# Patient Record
Sex: MALE | Race: WHITE | Employment: FULL TIME | URBAN - METROPOLITAN AREA
[De-identification: names, ages, dates, MRNs, and addresses within clinical notes are randomized per-mention and may not be internally consistent; named-entity substitution may affect disease eponyms.]

---

## 2017-01-04 ENCOUNTER — TELEPHONE (OUTPATIENT)
Dept: FAMILY MEDICINE CLINIC | Age: 37
End: 2017-01-04

## 2017-01-04 DIAGNOSIS — J06.9 UPPER RESPIRATORY TRACT INFECTION, UNSPECIFIED TYPE: Primary | ICD-10-CM

## 2017-01-05 RX ORDER — BENZONATATE 200 MG/1
200 CAPSULE ORAL 3 TIMES DAILY PRN
Qty: 30 CAPSULE | Refills: 0 | Status: SHIPPED | OUTPATIENT
Start: 2017-01-05 | End: 2017-01-12

## 2017-01-05 RX ORDER — AZITHROMYCIN 250 MG/1
TABLET, FILM COATED ORAL
Qty: 1 PACKET | Refills: 0 | Status: SHIPPED | OUTPATIENT
Start: 2017-01-05 | End: 2017-01-15

## 2017-02-07 ENCOUNTER — OFFICE VISIT (OUTPATIENT)
Dept: FAMILY MEDICINE CLINIC | Age: 37
End: 2017-02-07

## 2017-02-07 VITALS
HEART RATE: 140 BPM | RESPIRATION RATE: 20 BRPM | TEMPERATURE: 99.4 F | SYSTOLIC BLOOD PRESSURE: 116 MMHG | BODY MASS INDEX: 27.99 KG/M2 | DIASTOLIC BLOOD PRESSURE: 84 MMHG | WEIGHT: 206.4 LBS

## 2017-02-07 DIAGNOSIS — R00.0 TACHYCARDIA: ICD-10-CM

## 2017-02-07 DIAGNOSIS — F51.04 PSYCHOPHYSIOLOGICAL INSOMNIA: ICD-10-CM

## 2017-02-07 DIAGNOSIS — F41.1 GENERALIZED ANXIETY DISORDER: Primary | ICD-10-CM

## 2017-02-07 PROCEDURE — 99214 OFFICE O/P EST MOD 30 MIN: CPT | Performed by: FAMILY MEDICINE

## 2017-02-07 RX ORDER — ZOLPIDEM TARTRATE 10 MG/1
TABLET ORAL
Qty: 30 TABLET | Refills: 0 | Status: SHIPPED | OUTPATIENT
Start: 2017-02-07 | End: 2017-03-09 | Stop reason: SDUPTHER

## 2017-02-07 RX ORDER — ALPRAZOLAM 1 MG/1
1 TABLET ORAL 3 TIMES DAILY PRN
Qty: 90 TABLET | Refills: 2 | Status: SHIPPED | OUTPATIENT
Start: 2017-02-07 | End: 2017-03-31 | Stop reason: SDUPTHER

## 2017-02-14 DIAGNOSIS — R00.0 TACHYCARDIA: ICD-10-CM

## 2017-02-15 LAB
A/G RATIO: 1.4 (ref 1.1–2.2)
ALBUMIN SERPL-MCNC: 4.4 G/DL (ref 3.4–5)
ALP BLD-CCNC: 75 U/L (ref 40–129)
ALT SERPL-CCNC: 26 U/L (ref 10–40)
ANION GAP SERPL CALCULATED.3IONS-SCNC: 16 MMOL/L (ref 3–16)
AST SERPL-CCNC: 23 U/L (ref 15–37)
BILIRUB SERPL-MCNC: 0.3 MG/DL (ref 0–1)
BUN BLDV-MCNC: 14 MG/DL (ref 7–20)
CALCIUM SERPL-MCNC: 9.4 MG/DL (ref 8.3–10.6)
CHLORIDE BLD-SCNC: 102 MMOL/L (ref 99–110)
CO2: 25 MMOL/L (ref 21–32)
CREAT SERPL-MCNC: 0.8 MG/DL (ref 0.9–1.3)
GFR AFRICAN AMERICAN: >60
GFR NON-AFRICAN AMERICAN: >60
GLOBULIN: 3.1 G/DL
GLUCOSE BLD-MCNC: 91 MG/DL (ref 70–99)
HCT VFR BLD CALC: 47 % (ref 40.5–52.5)
HEMOGLOBIN: 15.5 G/DL (ref 13.5–17.5)
MCH RBC QN AUTO: 30 PG (ref 26–34)
MCHC RBC AUTO-ENTMCNC: 33 G/DL (ref 31–36)
MCV RBC AUTO: 90.9 FL (ref 80–100)
PDW BLD-RTO: 14.9 % (ref 12.4–15.4)
PLATELET # BLD: 270 K/UL (ref 135–450)
PMV BLD AUTO: 8.1 FL (ref 5–10.5)
POTASSIUM SERPL-SCNC: 4.2 MMOL/L (ref 3.5–5.1)
RBC # BLD: 5.17 M/UL (ref 4.2–5.9)
SODIUM BLD-SCNC: 143 MMOL/L (ref 136–145)
TOTAL PROTEIN: 7.5 G/DL (ref 6.4–8.2)
TSH REFLEX: 0.96 UIU/ML (ref 0.27–4.2)
WBC # BLD: 8.5 K/UL (ref 4–11)

## 2017-03-09 ENCOUNTER — OFFICE VISIT (OUTPATIENT)
Dept: FAMILY MEDICINE CLINIC | Age: 37
End: 2017-03-09

## 2017-03-09 VITALS
RESPIRATION RATE: 20 BRPM | DIASTOLIC BLOOD PRESSURE: 79 MMHG | OXYGEN SATURATION: 98 % | BODY MASS INDEX: 27.94 KG/M2 | HEART RATE: 101 BPM | SYSTOLIC BLOOD PRESSURE: 131 MMHG | WEIGHT: 206 LBS

## 2017-03-09 DIAGNOSIS — Z13.39 ADHD (ATTENTION DEFICIT HYPERACTIVITY DISORDER) EVALUATION: ICD-10-CM

## 2017-03-09 DIAGNOSIS — F51.04 PSYCHOPHYSIOLOGICAL INSOMNIA: ICD-10-CM

## 2017-03-09 DIAGNOSIS — R10.9 RIGHT FLANK PAIN: Primary | ICD-10-CM

## 2017-03-09 PROCEDURE — 99214 OFFICE O/P EST MOD 30 MIN: CPT | Performed by: FAMILY MEDICINE

## 2017-03-09 RX ORDER — DEXTROAMPHETAMINE SACCHARATE, AMPHETAMINE ASPARTATE, DEXTROAMPHETAMINE SULFATE AND AMPHETAMINE SULFATE 2.5; 2.5; 2.5; 2.5 MG/1; MG/1; MG/1; MG/1
10 TABLET ORAL DAILY
Qty: 30 TABLET | Refills: 0 | Status: SHIPPED | OUTPATIENT
Start: 2017-03-09 | End: 2017-03-31 | Stop reason: ALTCHOICE

## 2017-03-09 RX ORDER — ZOLPIDEM TARTRATE 10 MG/1
TABLET ORAL
Qty: 30 TABLET | Refills: 0 | Status: SHIPPED | OUTPATIENT
Start: 2017-03-09 | End: 2017-03-31 | Stop reason: SDUPTHER

## 2017-03-31 ENCOUNTER — OFFICE VISIT (OUTPATIENT)
Dept: FAMILY MEDICINE CLINIC | Age: 37
End: 2017-03-31

## 2017-03-31 VITALS
OXYGEN SATURATION: 96 % | SYSTOLIC BLOOD PRESSURE: 135 MMHG | HEART RATE: 102 BPM | DIASTOLIC BLOOD PRESSURE: 88 MMHG | RESPIRATION RATE: 20 BRPM | TEMPERATURE: 95.6 F | WEIGHT: 213.4 LBS | BODY MASS INDEX: 28.94 KG/M2

## 2017-03-31 DIAGNOSIS — F90.9 ADULT ADHD: Primary | ICD-10-CM

## 2017-03-31 DIAGNOSIS — F41.9 ANXIETY: ICD-10-CM

## 2017-03-31 DIAGNOSIS — F51.04 PSYCHOPHYSIOLOGICAL INSOMNIA: ICD-10-CM

## 2017-03-31 DIAGNOSIS — F41.1 GENERALIZED ANXIETY DISORDER: ICD-10-CM

## 2017-03-31 PROCEDURE — 99214 OFFICE O/P EST MOD 30 MIN: CPT | Performed by: FAMILY MEDICINE

## 2017-03-31 RX ORDER — DEXTROAMPHETAMINE SACCHARATE, AMPHETAMINE ASPARTATE, DEXTROAMPHETAMINE SULFATE AND AMPHETAMINE SULFATE 5; 5; 5; 5 MG/1; MG/1; MG/1; MG/1
20 TABLET ORAL 2 TIMES DAILY
Qty: 60 TABLET | Refills: 0 | Status: CANCELLED | OUTPATIENT
Start: 2017-05-30

## 2017-03-31 RX ORDER — ZOLPIDEM TARTRATE 10 MG/1
TABLET ORAL
Qty: 90 TABLET | Refills: 0 | Status: SHIPPED | OUTPATIENT
Start: 2017-03-31 | End: 2017-07-11 | Stop reason: SDUPTHER

## 2017-03-31 RX ORDER — DEXTROAMPHETAMINE SACCHARATE, AMPHETAMINE ASPARTATE, DEXTROAMPHETAMINE SULFATE AND AMPHETAMINE SULFATE 5; 5; 5; 5 MG/1; MG/1; MG/1; MG/1
20 TABLET ORAL 2 TIMES DAILY
Qty: 60 TABLET | Refills: 0 | Status: SHIPPED | OUTPATIENT
Start: 2017-03-31 | End: 2017-07-11 | Stop reason: SDUPTHER

## 2017-03-31 RX ORDER — DEXTROAMPHETAMINE SACCHARATE, AMPHETAMINE ASPARTATE, DEXTROAMPHETAMINE SULFATE AND AMPHETAMINE SULFATE 5; 5; 5; 5 MG/1; MG/1; MG/1; MG/1
20 TABLET ORAL 2 TIMES DAILY
Qty: 60 TABLET | Refills: 0 | Status: SHIPPED | OUTPATIENT
Start: 2017-04-30 | End: 2017-07-06 | Stop reason: CLARIF

## 2017-03-31 RX ORDER — ALPRAZOLAM 1 MG/1
1 TABLET ORAL 3 TIMES DAILY PRN
Qty: 90 TABLET | Refills: 2 | Status: SHIPPED | OUTPATIENT
Start: 2017-03-31 | End: 2017-07-11 | Stop reason: SDUPTHER

## 2017-03-31 RX ORDER — DEXTROAMPHETAMINE SACCHARATE, AMPHETAMINE ASPARTATE, DEXTROAMPHETAMINE SULFATE AND AMPHETAMINE SULFATE 5; 5; 5; 5 MG/1; MG/1; MG/1; MG/1
20 TABLET ORAL 2 TIMES DAILY
Qty: 60 TABLET | Refills: 0 | Status: CANCELLED | OUTPATIENT
Start: 2017-04-30

## 2017-03-31 RX ORDER — DEXTROAMPHETAMINE SACCHARATE, AMPHETAMINE ASPARTATE, DEXTROAMPHETAMINE SULFATE AND AMPHETAMINE SULFATE 5; 5; 5; 5 MG/1; MG/1; MG/1; MG/1
20 TABLET ORAL DAILY
Qty: 60 TABLET | Refills: 0 | Status: SHIPPED | OUTPATIENT
Start: 2017-05-30 | End: 2017-07-06 | Stop reason: CLARIF

## 2017-03-31 RX ORDER — DEXTROAMPHETAMINE SACCHARATE, AMPHETAMINE ASPARTATE, DEXTROAMPHETAMINE SULFATE AND AMPHETAMINE SULFATE 5; 5; 5; 5 MG/1; MG/1; MG/1; MG/1
20 TABLET ORAL 2 TIMES DAILY
Qty: 60 TABLET | Refills: 0 | Status: CANCELLED | OUTPATIENT
Start: 2017-03-31

## 2017-07-07 ENCOUNTER — TELEPHONE (OUTPATIENT)
Dept: FAMILY MEDICINE CLINIC | Age: 37
End: 2017-07-07

## 2017-07-07 NOTE — TELEPHONE ENCOUNTER
Patient called to advise Dr. Vel Rivas that he is in [facility] ER 7/6 admitted Endoscopy done on 7/7 had apt. @ office today that he missed wanted to advise office.

## 2017-07-11 ENCOUNTER — TELEPHONE (OUTPATIENT)
Dept: FAMILY MEDICINE CLINIC | Age: 37
End: 2017-07-11

## 2017-07-11 ENCOUNTER — OFFICE VISIT (OUTPATIENT)
Dept: FAMILY MEDICINE CLINIC | Age: 37
End: 2017-07-11

## 2017-07-11 VITALS
TEMPERATURE: 97.9 F | DIASTOLIC BLOOD PRESSURE: 84 MMHG | HEART RATE: 95 BPM | BODY MASS INDEX: 27.02 KG/M2 | WEIGHT: 204.8 LBS | SYSTOLIC BLOOD PRESSURE: 113 MMHG | OXYGEN SATURATION: 97 %

## 2017-07-11 DIAGNOSIS — R41.840 ATTENTION DEFICIT: Primary | ICD-10-CM

## 2017-07-11 DIAGNOSIS — F51.04 PSYCHOPHYSIOLOGICAL INSOMNIA: ICD-10-CM

## 2017-07-11 DIAGNOSIS — F98.8 ADULT ATTENTION DEFICIT DISORDER: Primary | ICD-10-CM

## 2017-07-11 DIAGNOSIS — K27.9 H PYLORI ULCER: ICD-10-CM

## 2017-07-11 DIAGNOSIS — Z86.59 HISTORY OF ADHD: ICD-10-CM

## 2017-07-11 DIAGNOSIS — D17.1 LIPOMA OF ABDOMINAL WALL: ICD-10-CM

## 2017-07-11 DIAGNOSIS — N20.1 LEFT URETERAL STONE: ICD-10-CM

## 2017-07-11 DIAGNOSIS — F41.1 GENERALIZED ANXIETY DISORDER: ICD-10-CM

## 2017-07-11 DIAGNOSIS — B96.81 H PYLORI ULCER: ICD-10-CM

## 2017-07-11 PROCEDURE — 99214 OFFICE O/P EST MOD 30 MIN: CPT | Performed by: FAMILY MEDICINE

## 2017-07-11 RX ORDER — ZOLPIDEM TARTRATE 10 MG/1
TABLET ORAL
Qty: 30 TABLET | Refills: 0 | Status: SHIPPED | OUTPATIENT
Start: 2017-07-11 | End: 2017-08-10

## 2017-07-11 RX ORDER — DEXTROAMPHETAMINE SACCHARATE, AMPHETAMINE ASPARTATE, DEXTROAMPHETAMINE SULFATE AND AMPHETAMINE SULFATE 5; 5; 5; 5 MG/1; MG/1; MG/1; MG/1
20 TABLET ORAL DAILY
Qty: 60 TABLET | Refills: 0 | Status: SHIPPED | OUTPATIENT
Start: 2017-07-11 | End: 2018-01-22 | Stop reason: DRUGHIGH

## 2017-07-11 RX ORDER — ALPRAZOLAM 1 MG/1
1 TABLET ORAL 3 TIMES DAILY PRN
Qty: 90 TABLET | Refills: 2 | Status: SHIPPED | OUTPATIENT
Start: 2017-07-11 | End: 2017-08-31 | Stop reason: SDUPTHER

## 2017-07-11 RX ORDER — DEXTROAMPHETAMINE SACCHARATE, AMPHETAMINE ASPARTATE, DEXTROAMPHETAMINE SULFATE AND AMPHETAMINE SULFATE 5; 5; 5; 5 MG/1; MG/1; MG/1; MG/1
20 TABLET ORAL 2 TIMES DAILY
Qty: 60 TABLET | Refills: 0 | Status: SHIPPED | OUTPATIENT
Start: 2017-09-09 | End: 2017-08-31 | Stop reason: SDUPTHER

## 2017-07-11 RX ORDER — DEXTROAMPHETAMINE SACCHARATE, AMPHETAMINE ASPARTATE, DEXTROAMPHETAMINE SULFATE AND AMPHETAMINE SULFATE 5; 5; 5; 5 MG/1; MG/1; MG/1; MG/1
20 TABLET ORAL 2 TIMES DAILY
Qty: 60 TABLET | Refills: 0 | Status: SHIPPED | OUTPATIENT
Start: 2017-08-10 | End: 2017-08-31 | Stop reason: SDUPTHER

## 2017-07-11 RX ORDER — DOXEPIN HYDROCHLORIDE 25 MG/1
25 CAPSULE ORAL NIGHTLY
Qty: 30 CAPSULE | Refills: 3 | Status: SHIPPED | OUTPATIENT
Start: 2017-07-11 | End: 2018-07-03

## 2017-07-17 ENCOUNTER — TELEPHONE (OUTPATIENT)
Dept: FAMILY MEDICINE CLINIC | Age: 37
End: 2017-07-17

## 2017-07-17 RX ORDER — DEXTROAMPHETAMINE SACCHARATE, AMPHETAMINE ASPARTATE, DEXTROAMPHETAMINE SULFATE AND AMPHETAMINE SULFATE 5; 5; 5; 5 MG/1; MG/1; MG/1; MG/1
20 TABLET ORAL 2 TIMES DAILY
Qty: 30 TABLET | Refills: 0 | Status: SHIPPED | OUTPATIENT
Start: 2017-07-17 | End: 2017-08-31 | Stop reason: SDUPTHER

## 2017-08-11 ENCOUNTER — TELEPHONE (OUTPATIENT)
Dept: FAMILY MEDICINE CLINIC | Age: 37
End: 2017-08-11

## 2017-08-24 ENCOUNTER — TELEPHONE (OUTPATIENT)
Dept: FAMILY MEDICINE CLINIC | Age: 37
End: 2017-08-24

## 2017-08-31 ENCOUNTER — OFFICE VISIT (OUTPATIENT)
Dept: FAMILY MEDICINE CLINIC | Age: 37
End: 2017-08-31

## 2017-08-31 VITALS
BODY MASS INDEX: 27.2 KG/M2 | OXYGEN SATURATION: 98 % | SYSTOLIC BLOOD PRESSURE: 153 MMHG | DIASTOLIC BLOOD PRESSURE: 82 MMHG | TEMPERATURE: 97.7 F | HEART RATE: 97 BPM | WEIGHT: 206.2 LBS

## 2017-08-31 DIAGNOSIS — F41.1 GENERALIZED ANXIETY DISORDER: ICD-10-CM

## 2017-08-31 DIAGNOSIS — F98.8 ADULT ATTENTION DEFICIT DISORDER: ICD-10-CM

## 2017-08-31 DIAGNOSIS — F51.04 PSYCHOPHYSIOLOGICAL INSOMNIA: ICD-10-CM

## 2017-08-31 PROCEDURE — 99214 OFFICE O/P EST MOD 30 MIN: CPT | Performed by: FAMILY MEDICINE

## 2017-08-31 RX ORDER — DEXTROAMPHETAMINE SACCHARATE, AMPHETAMINE ASPARTATE, DEXTROAMPHETAMINE SULFATE AND AMPHETAMINE SULFATE 5; 5; 5; 5 MG/1; MG/1; MG/1; MG/1
20 TABLET ORAL 2 TIMES DAILY
Qty: 60 TABLET | Refills: 0 | Status: SHIPPED | OUTPATIENT
Start: 2017-08-31 | End: 2017-11-03 | Stop reason: SDUPTHER

## 2017-08-31 RX ORDER — DEXTROAMPHETAMINE SACCHARATE, AMPHETAMINE ASPARTATE, DEXTROAMPHETAMINE SULFATE AND AMPHETAMINE SULFATE 5; 5; 5; 5 MG/1; MG/1; MG/1; MG/1
20 TABLET ORAL 2 TIMES DAILY
Qty: 60 TABLET | Refills: 0 | Status: SHIPPED | OUTPATIENT
Start: 2017-09-30 | End: 2017-11-03 | Stop reason: SDUPTHER

## 2017-08-31 RX ORDER — DEXTROAMPHETAMINE SACCHARATE, AMPHETAMINE ASPARTATE, DEXTROAMPHETAMINE SULFATE AND AMPHETAMINE SULFATE 5; 5; 5; 5 MG/1; MG/1; MG/1; MG/1
20 TABLET ORAL 2 TIMES DAILY
Qty: 60 TABLET | Refills: 0 | Status: SHIPPED | OUTPATIENT
Start: 2017-10-30 | End: 2017-11-03 | Stop reason: SDUPTHER

## 2017-08-31 RX ORDER — ALPRAZOLAM 1 MG/1
1 TABLET ORAL 3 TIMES DAILY PRN
Qty: 90 TABLET | Refills: 2 | Status: SHIPPED | OUTPATIENT
Start: 2017-08-31 | End: 2017-11-03 | Stop reason: SDUPTHER

## 2017-11-03 ENCOUNTER — OFFICE VISIT (OUTPATIENT)
Dept: FAMILY MEDICINE CLINIC | Age: 37
End: 2017-11-03

## 2017-11-03 ENCOUNTER — HOSPITAL ENCOUNTER (OUTPATIENT)
Dept: OTHER | Age: 37
Discharge: HOME | End: 2017-11-03
Attending: FAMILY MEDICINE | Admitting: FAMILY MEDICINE

## 2017-11-03 VITALS
BODY MASS INDEX: 29.4 KG/M2 | OXYGEN SATURATION: 97 % | DIASTOLIC BLOOD PRESSURE: 102 MMHG | RESPIRATION RATE: 18 BRPM | SYSTOLIC BLOOD PRESSURE: 148 MMHG | HEIGHT: 71 IN | TEMPERATURE: 97.1 F | WEIGHT: 210 LBS | HEART RATE: 107 BPM

## 2017-11-03 DIAGNOSIS — M25.512 CHRONIC PAIN OF BOTH SHOULDERS: ICD-10-CM

## 2017-11-03 DIAGNOSIS — M54.2 CERVICALGIA: ICD-10-CM

## 2017-11-03 DIAGNOSIS — G89.29 UPPER BACK PAIN, CHRONIC: ICD-10-CM

## 2017-11-03 DIAGNOSIS — G89.29 CHRONIC PAIN OF RIGHT KNEE: ICD-10-CM

## 2017-11-03 DIAGNOSIS — F41.1 GENERALIZED ANXIETY DISORDER: ICD-10-CM

## 2017-11-03 DIAGNOSIS — G89.29 CHRONIC PAIN OF RIGHT KNEE: Primary | ICD-10-CM

## 2017-11-03 DIAGNOSIS — M25.561 CHRONIC PAIN OF RIGHT KNEE: Primary | ICD-10-CM

## 2017-11-03 DIAGNOSIS — G89.29 CHRONIC PAIN OF BOTH SHOULDERS: ICD-10-CM

## 2017-11-03 DIAGNOSIS — M54.40 LOW BACK PAIN WITH SCIATICA, SCIATICA LATERALITY UNSPECIFIED, UNSPECIFIED BACK PAIN LATERALITY, UNSPECIFIED CHRONICITY: ICD-10-CM

## 2017-11-03 DIAGNOSIS — F51.04 PSYCHOPHYSIOLOGICAL INSOMNIA: ICD-10-CM

## 2017-11-03 DIAGNOSIS — F98.8 ADULT ATTENTION DEFICIT DISORDER: ICD-10-CM

## 2017-11-03 DIAGNOSIS — M25.511 CHRONIC PAIN OF BOTH SHOULDERS: ICD-10-CM

## 2017-11-03 DIAGNOSIS — M54.9 UPPER BACK PAIN, CHRONIC: ICD-10-CM

## 2017-11-03 DIAGNOSIS — M25.561 CHRONIC PAIN OF RIGHT KNEE: ICD-10-CM

## 2017-11-03 PROCEDURE — G8417 CALC BMI ABV UP PARAM F/U: HCPCS | Performed by: FAMILY MEDICINE

## 2017-11-03 PROCEDURE — 4004F PT TOBACCO SCREEN RCVD TLK: CPT | Performed by: FAMILY MEDICINE

## 2017-11-03 PROCEDURE — 99214 OFFICE O/P EST MOD 30 MIN: CPT | Performed by: FAMILY MEDICINE

## 2017-11-03 PROCEDURE — G8484 FLU IMMUNIZE NO ADMIN: HCPCS | Performed by: FAMILY MEDICINE

## 2017-11-03 PROCEDURE — G8427 DOCREV CUR MEDS BY ELIG CLIN: HCPCS | Performed by: FAMILY MEDICINE

## 2017-11-03 RX ORDER — DEXTROAMPHETAMINE SACCHARATE, AMPHETAMINE ASPARTATE, DEXTROAMPHETAMINE SULFATE AND AMPHETAMINE SULFATE 5; 5; 5; 5 MG/1; MG/1; MG/1; MG/1
20 TABLET ORAL 2 TIMES DAILY
Qty: 60 TABLET | Refills: 0 | Status: SHIPPED | OUTPATIENT
Start: 2017-12-03 | End: 2017-12-22 | Stop reason: DRUGHIGH

## 2017-11-03 RX ORDER — GABAPENTIN 300 MG/1
CAPSULE ORAL
Qty: 90 CAPSULE | Refills: 1 | Status: SHIPPED | OUTPATIENT
Start: 2017-11-03 | End: 2018-01-11 | Stop reason: SDUPTHER

## 2017-11-03 RX ORDER — ALPRAZOLAM 1 MG/1
1 TABLET ORAL 3 TIMES DAILY PRN
Qty: 90 TABLET | Refills: 2 | Status: SHIPPED | OUTPATIENT
Start: 2018-01-02 | End: 2018-01-05 | Stop reason: SDUPTHER

## 2017-11-03 RX ORDER — DEXTROAMPHETAMINE SACCHARATE, AMPHETAMINE ASPARTATE, DEXTROAMPHETAMINE SULFATE AND AMPHETAMINE SULFATE 5; 5; 5; 5 MG/1; MG/1; MG/1; MG/1
20 TABLET ORAL 2 TIMES DAILY
Qty: 60 TABLET | Refills: 0 | Status: SHIPPED | OUTPATIENT
Start: 2017-11-03 | End: 2017-12-22 | Stop reason: DRUGHIGH

## 2017-11-03 ASSESSMENT — PATIENT HEALTH QUESTIONNAIRE - PHQ9
2. FEELING DOWN, DEPRESSED OR HOPELESS: 0
1. LITTLE INTEREST OR PLEASURE IN DOING THINGS: 0
SUM OF ALL RESPONSES TO PHQ QUESTIONS 1-9: 0
SUM OF ALL RESPONSES TO PHQ9 QUESTIONS 1 & 2: 0

## 2017-11-07 NOTE — PROGRESS NOTES
Resp 18   Ht 5' 11.25\" (1.81 m)   Wt 210 lb (95.3 kg)   SpO2 97%   BMI 29.08 kg/m²   Physical Exam  GEN: No acute distress, cooperative, well nourished, alert. HEENT: PEERLA, EOMI , normocephalic/atraumatic, nares and oropharynx clear. Mucus membranes normal, Tympanic membranes clear bilaterally. Neck: soft, supple, no thyromegaly, mass, no Lymphadenopathy  CV: Regular rate and rhythm, no murmur, rubs, gallops. No edema. Resp: Clear to auscultation bilaterally good air entry bilaterally  No crackles, wheeze. Breathing comfortably. Psych: normal affect. Neuro: AOx3  MSK: Negative empty can bilaterally. 5/5  strength bilaterally. ASSESSMENT  1. Chronic pain of right knee  External Referral To Orthopedic Surgery    gabapentin (NEURONTIN) 300 MG capsule    CANCELED: XR LUMBAR SPINE (MIN 4 VIEWS)    CANCELED: XR Cervical Spine 2 or 3 VW   2. Chronic pain of both shoulders  External Referral To Orthopedic Surgery    gabapentin (NEURONTIN) 300 MG capsule   3. Adult attention deficit disorder  amphetamine-dextroamphetamine (ADDERALL, 20MG,) 20 MG tablet    amphetamine-dextroamphetamine (ADDERALL, 20MG,) 20 MG tablet    amphetamine-dextroamphetamine (ADDERALL, 20MG,) 20 MG tablet   4. Generalized anxiety disorder  ALPRAZolam (XANAX) 1 MG tablet   5. Psychophysiological insomnia  ALPRAZolam (XANAX) 1 MG tablet   6. Upper back pain, chronic  XR THORACIC SPINE (3 VIEWS)   7. Cervicalgia  XR CERVICAL SPINE (2-3 VIEWS)   8. Low back pain with sciatica, sciatica laterality unspecified, unspecified back pain laterality, unspecified chronicity  XR LUMBAR SPINE (2-3 VIEWS)     Refer to orthopedics at patient's request.  #3: The current medical regimen is effective;  continue present plan and medications. #4 and #5: The current medical regimen is effective;  continue present plan and medications. #7 and #8: evaluate. PLAN          See rest of plan under patient instructions.     Return in about 3 months (around 2/3/2018). There are no Patient Instructions on file for this visit. Please note a portion of this chart was generated using dragon dictation software. Although every effort was made to ensure the accuracy of this automated transcription, some errors in transcription may have occurred.

## 2017-12-01 ENCOUNTER — TELEPHONE (OUTPATIENT)
Dept: FAMILY MEDICINE CLINIC | Age: 37
End: 2017-12-01

## 2017-12-01 DIAGNOSIS — F98.8 ADULT ATTENTION DEFICIT DISORDER: Primary | ICD-10-CM

## 2017-12-01 NOTE — TELEPHONE ENCOUNTER
What he can do in the meantime is take 1-1/2 tablets of the Adderall 20 mg. Continue the 1 tablet Adderall 20 mg midday. Please call clinic with an update how he's doing on this regimen sometime the next 5-7 days.

## 2017-12-22 RX ORDER — DEXTROAMPHETAMINE SACCHARATE, AMPHETAMINE ASPARTATE, DEXTROAMPHETAMINE SULFATE AND AMPHETAMINE SULFATE 7.5; 7.5; 7.5; 7.5 MG/1; MG/1; MG/1; MG/1
30 TABLET ORAL DAILY
Qty: 60 TABLET | Refills: 0 | Status: SHIPPED | OUTPATIENT
Start: 2017-12-22 | End: 2018-01-22 | Stop reason: SDUPTHER

## 2017-12-22 NOTE — TELEPHONE ENCOUNTER
E-prescribed 30 mg Adderall tabs to [facility]. Take 1 tab BID. Call clinic with update how this is working in January. I could then refill the late Jan script if he is doing well on dose. Since seen Nov 3, he would be due for clinic visit early February.

## 2017-12-22 NOTE — TELEPHONE ENCOUNTER
Pt states that he has been trying the new medication dosage on Adderall, and states that it has NOT made much of an improvement. He is just about out of medication however, so he would like to have the next script sent over to his pharmacy below. If PCP has any suggestions as to another dosage or medication to try instead, he would be open to it. Pt just requested return call with update to [phone]. Thank you!     [facility] [address] - F [phone]

## 2017-12-28 DIAGNOSIS — M25.512 CHRONIC PAIN OF BOTH SHOULDERS: ICD-10-CM

## 2017-12-28 DIAGNOSIS — F41.1 GENERALIZED ANXIETY DISORDER: ICD-10-CM

## 2017-12-28 DIAGNOSIS — G89.29 CHRONIC PAIN OF BOTH SHOULDERS: ICD-10-CM

## 2017-12-28 DIAGNOSIS — M25.561 CHRONIC PAIN OF RIGHT KNEE: ICD-10-CM

## 2017-12-28 DIAGNOSIS — F51.04 PSYCHOPHYSIOLOGICAL INSOMNIA: ICD-10-CM

## 2017-12-28 DIAGNOSIS — G89.29 CHRONIC PAIN OF RIGHT KNEE: ICD-10-CM

## 2017-12-28 DIAGNOSIS — M25.511 CHRONIC PAIN OF BOTH SHOULDERS: ICD-10-CM

## 2017-12-29 RX ORDER — ALPRAZOLAM 1 MG/1
TABLET ORAL
Qty: 90 TABLET | Refills: 0 | OUTPATIENT
Start: 2017-12-29

## 2017-12-29 RX ORDER — GABAPENTIN 300 MG/1
CAPSULE ORAL
Qty: 90 CAPSULE | Refills: 0 | OUTPATIENT
Start: 2017-12-29

## 2018-01-05 DIAGNOSIS — F41.1 GENERALIZED ANXIETY DISORDER: ICD-10-CM

## 2018-01-05 DIAGNOSIS — F51.04 PSYCHOPHYSIOLOGICAL INSOMNIA: ICD-10-CM

## 2018-01-08 RX ORDER — ALPRAZOLAM 1 MG/1
1 TABLET ORAL 3 TIMES DAILY PRN
Qty: 90 TABLET | Refills: 0 | Status: SHIPPED | OUTPATIENT
Start: 2018-01-08 | End: 2018-01-29 | Stop reason: SDUPTHER

## 2018-01-08 NOTE — TELEPHONE ENCOUNTER
--Adderall 30 mg BID is the most I can write for this medication. --I sent 30 day supply of Xanax to pharmacy. --Keep appt on Jan 29.

## 2018-01-11 DIAGNOSIS — M25.511 CHRONIC PAIN OF BOTH SHOULDERS: ICD-10-CM

## 2018-01-11 DIAGNOSIS — M25.561 CHRONIC PAIN OF RIGHT KNEE: ICD-10-CM

## 2018-01-11 DIAGNOSIS — G89.29 CHRONIC PAIN OF RIGHT KNEE: ICD-10-CM

## 2018-01-11 DIAGNOSIS — M25.512 CHRONIC PAIN OF BOTH SHOULDERS: ICD-10-CM

## 2018-01-11 DIAGNOSIS — G89.29 CHRONIC PAIN OF BOTH SHOULDERS: ICD-10-CM

## 2018-01-12 RX ORDER — GABAPENTIN 300 MG/1
300 CAPSULE ORAL 3 TIMES DAILY
Qty: 90 CAPSULE | Refills: 1 | Status: SHIPPED | OUTPATIENT
Start: 2018-01-12 | End: 2018-01-29 | Stop reason: SDUPTHER

## 2018-01-22 DIAGNOSIS — F98.8 ADULT ATTENTION DEFICIT DISORDER: ICD-10-CM

## 2018-01-22 RX ORDER — DEXTROAMPHETAMINE SACCHARATE, AMPHETAMINE ASPARTATE, DEXTROAMPHETAMINE SULFATE AND AMPHETAMINE SULFATE 7.5; 7.5; 7.5; 7.5 MG/1; MG/1; MG/1; MG/1
30 TABLET ORAL 2 TIMES DAILY
Qty: 60 TABLET | Refills: 0 | Status: SHIPPED | OUTPATIENT
Start: 2018-01-22 | End: 2018-01-29 | Stop reason: SDUPTHER

## 2018-01-22 NOTE — TELEPHONE ENCOUNTER
Pt requesting refill on Adderall 30 mg. Next OV 1/29/2018, last OV 11/3/2017. Please advise. Thanks.       [facility] [address] [place] [address] Drive - F [phone]

## 2018-01-29 ENCOUNTER — OFFICE VISIT (OUTPATIENT)
Dept: FAMILY MEDICINE CLINIC | Age: 38
End: 2018-01-29

## 2018-01-29 VITALS
RESPIRATION RATE: 18 BRPM | SYSTOLIC BLOOD PRESSURE: 145 MMHG | WEIGHT: 209 LBS | HEART RATE: 108 BPM | DIASTOLIC BLOOD PRESSURE: 90 MMHG | BODY MASS INDEX: 28.95 KG/M2

## 2018-01-29 DIAGNOSIS — M25.561 CHRONIC PAIN OF RIGHT KNEE: ICD-10-CM

## 2018-01-29 DIAGNOSIS — M25.511 CHRONIC PAIN OF BOTH SHOULDERS: ICD-10-CM

## 2018-01-29 DIAGNOSIS — F98.8 ADULT ATTENTION DEFICIT DISORDER: Primary | ICD-10-CM

## 2018-01-29 DIAGNOSIS — S61.231A PUNCTURE WOUND OF LEFT INDEX FINGER: ICD-10-CM

## 2018-01-29 DIAGNOSIS — G89.29 CHRONIC PAIN OF BOTH SHOULDERS: ICD-10-CM

## 2018-01-29 DIAGNOSIS — G89.29 CHRONIC PAIN OF RIGHT KNEE: ICD-10-CM

## 2018-01-29 DIAGNOSIS — F51.04 PSYCHOPHYSIOLOGICAL INSOMNIA: ICD-10-CM

## 2018-01-29 DIAGNOSIS — M25.512 CHRONIC PAIN OF BOTH SHOULDERS: ICD-10-CM

## 2018-01-29 DIAGNOSIS — F41.1 GENERALIZED ANXIETY DISORDER: ICD-10-CM

## 2018-01-29 PROCEDURE — G8484 FLU IMMUNIZE NO ADMIN: HCPCS | Performed by: FAMILY MEDICINE

## 2018-01-29 PROCEDURE — 99214 OFFICE O/P EST MOD 30 MIN: CPT | Performed by: FAMILY MEDICINE

## 2018-01-29 PROCEDURE — G8427 DOCREV CUR MEDS BY ELIG CLIN: HCPCS | Performed by: FAMILY MEDICINE

## 2018-01-29 PROCEDURE — G8417 CALC BMI ABV UP PARAM F/U: HCPCS | Performed by: FAMILY MEDICINE

## 2018-01-29 PROCEDURE — 4004F PT TOBACCO SCREEN RCVD TLK: CPT | Performed by: FAMILY MEDICINE

## 2018-01-29 RX ORDER — ALPRAZOLAM 1 MG/1
1 TABLET ORAL 3 TIMES DAILY PRN
Qty: 90 TABLET | Refills: 1 | Status: SHIPPED | OUTPATIENT
Start: 2018-02-07 | End: 2018-02-28

## 2018-01-29 RX ORDER — GABAPENTIN 300 MG/1
300 CAPSULE ORAL 3 TIMES DAILY
Qty: 90 CAPSULE | Refills: 1 | Status: SHIPPED | OUTPATIENT
Start: 2018-01-29 | End: 2018-05-07 | Stop reason: SDUPTHER

## 2018-01-29 RX ORDER — DEXTROAMPHETAMINE SACCHARATE, AMPHETAMINE ASPARTATE, DEXTROAMPHETAMINE SULFATE AND AMPHETAMINE SULFATE 7.5; 7.5; 7.5; 7.5 MG/1; MG/1; MG/1; MG/1
30 TABLET ORAL 2 TIMES DAILY
Qty: 60 TABLET | Refills: 0 | Status: SHIPPED | OUTPATIENT
Start: 2018-03-23 | End: 2018-05-07 | Stop reason: SDUPTHER

## 2018-01-29 RX ORDER — DEXTROAMPHETAMINE SACCHARATE, AMPHETAMINE ASPARTATE, DEXTROAMPHETAMINE SULFATE AND AMPHETAMINE SULFATE 7.5; 7.5; 7.5; 7.5 MG/1; MG/1; MG/1; MG/1
30 TABLET ORAL 2 TIMES DAILY
Qty: 60 TABLET | Refills: 0 | Status: SHIPPED | OUTPATIENT
Start: 2018-02-21 | End: 2018-05-07 | Stop reason: SDUPTHER

## 2018-01-29 NOTE — PROGRESS NOTES
DO Albina     Attestation: The Prescription Monitoring Report for this patient was reviewed today. (Chanda Boeck, DO)  Documentation: Possible medication side effects, risk of tolerance and/or dependence, and alternative treatments discussed., No signs of potential drug abuse or diversion identified. (Chanda Boeck, DO)    Vitals:    01/29/18 1002 01/29/18 1144   BP: (!) 145/90 (!) 145/90   Pulse: 108    Resp: 18    Weight: 209 lb (94.8 kg)       Wt Readings from Last 3 Encounters:   01/29/18 209 lb (94.8 kg)   11/03/17 210 lb (95.3 kg)   08/31/17 206 lb 3.2 oz (93.5 kg)     BP Readings from Last 3 Encounters:   01/29/18 (!) 145/90   11/03/17 (!) 148/102   08/31/17 (!) 153/82       Patient Active Problem List   Diagnosis    Brachial plexus injury    Anxiety    History of ADHD    Tobacco use    Anxiety disorder    Cervical strain    Strength loss of left hand    Psychophysiological insomnia    Frequent urination    Elevated blood pressure reading    Acute gastritis without hemorrhage    Lipoma of abdominal wall           Immunization History   Administered Date(s) Administered    Tdap (Boostrix, Adacel) 07/12/2016, 08/10/2017       Past Medical History:   Diagnosis Date    Anxiety     H/O knee surgery     right    H/O shoulder surgery     left shoulder    Hypertension     IBS (irritable bowel syndrome)     MVP (mitral valve prolapse)     Neuropathy ([facility] [address]      History reviewed. No pertinent surgical history. Family History   Problem Relation Age of Onset    High Blood Pressure Father     Heart Disease Father     Substance Abuse Father     Diabetes Paternal Grandmother      Social History     Social History    Marital status: Single     Spouse name: N/A    Number of children: N/A    Years of education: N/A     Occupational History    Not on file.      Social History Main Topics    Smoking status: Heavy Tobacco Smoker     Packs/day: 0.50     Types: Cigarettes    Smokeless tobacco: Former User      Comment: vaping    Alcohol use Yes      Comment: twice weekly    Drug use: Yes     Types: Opiates       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  Sexual activity: No     Other Topics Concern    Not on file     Social History Narrative    No narrative on file       O: BP (!) 145/90   Pulse 108   Resp 18   Wt 209 lb (94.8 kg)   BMI 28.95 kg/m²   Physical Exam   Musculoskeletal:        Hands:  Irregularly shaped small skin flap of the epidermis/dermis noted on the distal index finger on the medial aspect     No cellulitis noted. GEN: No acute distress, cooperative, well nourished, alert. HEENT: PEERLA, EOMI , normocephalic/atraumatic, nares and oropharynx clear. Mucus membranes normal, Tympanic membranes clear bilaterally. Neck: soft, supple, no thyromegaly, mass, no Lymphadenopathy  CV: Regular rate and rhythm, no murmur, rubs, gallops. No edema. Resp: Clear to auscultation bilaterally good air entry bilaterally  No crackles, wheeze. Breathing comfortably. Psych: normal affect. Neuro: AOx3        ASSESSMENT  1. Adult attention deficit disorder  amphetamine-dextroamphetamine (ADDERALL) 30 MG tablet    amphetamine-dextroamphetamine (ADDERALL, 30MG,) 30 MG tablet   2. Puncture wound of left index finger  topical skin adhesive stick   3. Generalized anxiety disorder  ALPRAZolam (XANAX) 1 MG tablet   4. Psychophysiological insomnia  ALPRAZolam (XANAX) 1 MG tablet   5. Chronic pain of right knee  gabapentin (NEURONTIN) 300 MG capsule   6. Chronic pain of both shoulders  gabapentin (NEURONTIN) 300 MG capsule     #2  I cleased the index finger with soap and water, wiped the surrounding issue with alcohol prep pad, easily placed dermabond on the wound and flap and wrapped a sterri strip around this. I then applied a vaseline gauze followed by surgical tape.   Reviewed wound care instructions of looking at the finger and replacing with a new vaseline impregnanted gauze

## 2018-01-29 NOTE — PATIENT INSTRUCTIONS
Take no more than 3 capsules of gabapentin. To wean to lower dose, you can do this over the span of 1 week.

## 2018-05-07 ENCOUNTER — OFFICE VISIT (OUTPATIENT)
Dept: FAMILY MEDICINE CLINIC | Age: 38
End: 2018-05-07

## 2018-05-07 VITALS
RESPIRATION RATE: 12 BRPM | HEART RATE: 101 BPM | SYSTOLIC BLOOD PRESSURE: 124 MMHG | TEMPERATURE: 97.3 F | DIASTOLIC BLOOD PRESSURE: 92 MMHG | BODY MASS INDEX: 29.06 KG/M2 | OXYGEN SATURATION: 98 % | WEIGHT: 209.8 LBS

## 2018-05-07 DIAGNOSIS — M25.561 CHRONIC PAIN OF RIGHT KNEE: ICD-10-CM

## 2018-05-07 DIAGNOSIS — M25.512 BILATERAL SHOULDER PAIN, UNSPECIFIED CHRONICITY: ICD-10-CM

## 2018-05-07 DIAGNOSIS — F98.8 ADULT ATTENTION DEFICIT DISORDER: Primary | ICD-10-CM

## 2018-05-07 DIAGNOSIS — F41.9 ANXIETY: ICD-10-CM

## 2018-05-07 DIAGNOSIS — M25.512 CHRONIC PAIN OF BOTH SHOULDERS: ICD-10-CM

## 2018-05-07 DIAGNOSIS — M25.511 CHRONIC PAIN OF BOTH SHOULDERS: ICD-10-CM

## 2018-05-07 DIAGNOSIS — M25.511 BILATERAL SHOULDER PAIN, UNSPECIFIED CHRONICITY: ICD-10-CM

## 2018-05-07 DIAGNOSIS — G89.29 CHRONIC PAIN OF BOTH SHOULDERS: ICD-10-CM

## 2018-05-07 DIAGNOSIS — G89.29 CHRONIC PAIN OF RIGHT KNEE: ICD-10-CM

## 2018-05-07 PROCEDURE — G8427 DOCREV CUR MEDS BY ELIG CLIN: HCPCS | Performed by: FAMILY MEDICINE

## 2018-05-07 PROCEDURE — 4004F PT TOBACCO SCREEN RCVD TLK: CPT | Performed by: FAMILY MEDICINE

## 2018-05-07 PROCEDURE — 99214 OFFICE O/P EST MOD 30 MIN: CPT | Performed by: FAMILY MEDICINE

## 2018-05-07 PROCEDURE — G8417 CALC BMI ABV UP PARAM F/U: HCPCS | Performed by: FAMILY MEDICINE

## 2018-05-07 RX ORDER — ALPRAZOLAM 1 MG/1
1 TABLET ORAL 3 TIMES DAILY PRN
COMMUNITY
End: 2018-05-07 | Stop reason: SDUPTHER

## 2018-05-07 RX ORDER — DEXTROAMPHETAMINE SACCHARATE, AMPHETAMINE ASPARTATE, DEXTROAMPHETAMINE SULFATE AND AMPHETAMINE SULFATE 7.5; 7.5; 7.5; 7.5 MG/1; MG/1; MG/1; MG/1
30 TABLET ORAL 2 TIMES DAILY
Qty: 60 TABLET | Refills: 0 | Status: SHIPPED | OUTPATIENT
Start: 2018-05-07 | End: 2018-07-03 | Stop reason: SDUPTHER

## 2018-05-07 RX ORDER — ALPRAZOLAM 1 MG/1
1 TABLET ORAL 2 TIMES DAILY PRN
Qty: 90 TABLET | Refills: 1 | Status: SHIPPED | OUTPATIENT
Start: 2018-05-07 | End: 2018-06-01 | Stop reason: SDUPTHER

## 2018-05-07 RX ORDER — DEXTROAMPHETAMINE SACCHARATE, AMPHETAMINE ASPARTATE, DEXTROAMPHETAMINE SULFATE AND AMPHETAMINE SULFATE 7.5; 7.5; 7.5; 7.5 MG/1; MG/1; MG/1; MG/1
30 TABLET ORAL 2 TIMES DAILY
Qty: 60 TABLET | Refills: 0 | Status: SHIPPED | OUTPATIENT
Start: 2018-06-06 | End: 2018-07-03 | Stop reason: SDUPTHER

## 2018-05-07 RX ORDER — GABAPENTIN 300 MG/1
300 CAPSULE ORAL 3 TIMES DAILY
Qty: 90 CAPSULE | Refills: 1 | Status: SHIPPED | OUTPATIENT
Start: 2018-05-07 | End: 2018-10-23 | Stop reason: SDUPTHER

## 2018-06-20 ENCOUNTER — TELEPHONE (OUTPATIENT)
Dept: FAMILY MEDICINE CLINIC | Age: 38
End: 2018-06-20

## 2018-06-20 DIAGNOSIS — F41.9 ANXIETY: ICD-10-CM

## 2018-06-20 RX ORDER — ALPRAZOLAM 1 MG/1
1 TABLET ORAL 2 TIMES DAILY PRN
Qty: 60 TABLET | Refills: 0 | Status: SHIPPED | OUTPATIENT
Start: 2018-06-20 | End: 2018-07-03 | Stop reason: SDUPTHER

## 2018-06-20 NOTE — TELEPHONE ENCOUNTER
Medication name:XANAX  Medication dose:1 MG  Frequency:Take 1 tablet by mouth 2 times daily as needed for Sleep or Anxiety for up to 30 days  Quantity:60  Pharmacy name:[facility] [address] [place] [address] [phone] - F [phone]

## 2018-06-20 NOTE — TELEPHONE ENCOUNTER
OARRS was reviewed on 06/20/18 and activity shows alprazolam 1 mg tablets dispensed May 7, 2018. It has been over 30 days. I will e-prescribe 60 more tablets. Please inform patient.

## 2018-07-03 ENCOUNTER — OFFICE VISIT (OUTPATIENT)
Dept: FAMILY MEDICINE CLINIC | Age: 38
End: 2018-07-03

## 2018-07-03 VITALS
OXYGEN SATURATION: 99 % | HEART RATE: 98 BPM | BODY MASS INDEX: 28.39 KG/M2 | RESPIRATION RATE: 20 BRPM | WEIGHT: 205 LBS | SYSTOLIC BLOOD PRESSURE: 128 MMHG | DIASTOLIC BLOOD PRESSURE: 76 MMHG | TEMPERATURE: 97 F

## 2018-07-03 DIAGNOSIS — F98.8 ADULT ATTENTION DEFICIT DISORDER: Primary | ICD-10-CM

## 2018-07-03 DIAGNOSIS — F41.9 ANXIETY: ICD-10-CM

## 2018-07-03 DIAGNOSIS — F51.04 PSYCHOPHYSIOLOGICAL INSOMNIA: ICD-10-CM

## 2018-07-03 PROCEDURE — 99214 OFFICE O/P EST MOD 30 MIN: CPT | Performed by: FAMILY MEDICINE

## 2018-07-03 PROCEDURE — 4004F PT TOBACCO SCREEN RCVD TLK: CPT | Performed by: FAMILY MEDICINE

## 2018-07-03 PROCEDURE — G8417 CALC BMI ABV UP PARAM F/U: HCPCS | Performed by: FAMILY MEDICINE

## 2018-07-03 PROCEDURE — G8427 DOCREV CUR MEDS BY ELIG CLIN: HCPCS | Performed by: FAMILY MEDICINE

## 2018-07-03 RX ORDER — DEXTROAMPHETAMINE SACCHARATE, AMPHETAMINE ASPARTATE, DEXTROAMPHETAMINE SULFATE AND AMPHETAMINE SULFATE 7.5; 7.5; 7.5; 7.5 MG/1; MG/1; MG/1; MG/1
30 TABLET ORAL 2 TIMES DAILY
Qty: 60 TABLET | Refills: 0 | Status: SHIPPED | OUTPATIENT
Start: 2018-09-07 | End: 2018-10-23 | Stop reason: SDUPTHER

## 2018-07-03 RX ORDER — DEXTROAMPHETAMINE SACCHARATE, AMPHETAMINE ASPARTATE, DEXTROAMPHETAMINE SULFATE AND AMPHETAMINE SULFATE 7.5; 7.5; 7.5; 7.5 MG/1; MG/1; MG/1; MG/1
30 TABLET ORAL 2 TIMES DAILY
Qty: 60 TABLET | Refills: 0 | Status: SHIPPED | OUTPATIENT
Start: 2018-08-08 | End: 2018-08-20 | Stop reason: SDUPTHER

## 2018-07-03 RX ORDER — DEXTROAMPHETAMINE SACCHARATE, AMPHETAMINE ASPARTATE, DEXTROAMPHETAMINE SULFATE AND AMPHETAMINE SULFATE 7.5; 7.5; 7.5; 7.5 MG/1; MG/1; MG/1; MG/1
30 TABLET ORAL 2 TIMES DAILY
Qty: 60 TABLET | Refills: 0 | Status: SHIPPED | OUTPATIENT
Start: 2018-07-09 | End: 2018-10-23 | Stop reason: SDUPTHER

## 2018-07-03 RX ORDER — ALPRAZOLAM 1 MG/1
1 TABLET ORAL 3 TIMES DAILY PRN
Qty: 90 TABLET | Refills: 2 | Status: SHIPPED | OUTPATIENT
Start: 2018-07-19 | End: 2018-10-23 | Stop reason: SDUPTHER

## 2018-07-03 RX ORDER — DEXTROAMPHETAMINE SACCHARATE, AMPHETAMINE ASPARTATE, DEXTROAMPHETAMINE SULFATE AND AMPHETAMINE SULFATE 7.5; 7.5; 7.5; 7.5 MG/1; MG/1; MG/1; MG/1
30 TABLET ORAL 2 TIMES DAILY
Qty: 60 TABLET | Refills: 0 | Status: SHIPPED | OUTPATIENT
Start: 2018-08-07 | End: 2018-07-03 | Stop reason: SDUPTHER

## 2018-07-03 NOTE — PROGRESS NOTES
[facility]  Progress Note  Vamsi Brooks DO          Omar Polk  [birth year]    07/03/18    Chief Complaint:   Omar Polk is a 40 y.o. 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 60 tabs of Xanax dispensed 6/29. Needing to take Xanax TID for insomnia and naxiety. Adderall medication is helpful and denies side effects/palpitations/headaches. ROS negative for headache, vision changes, chest pain, shortness of breath, abdominal pain, urinary sx, bowel changes. Past medical, surgical, and social history reviewed. Medications and allergies reviewed. Allergies   Allergen Reactions    Lactose Intolerance (Gi) Nausea Only     Prior to Visit Medications    Medication Sig Taking? Authorizing Provider   amphetamine-dextroamphetamine (ADDERALL, 30MG,) 30 MG tablet Take 1 tablet by mouth 2 times daily for 60 doses. [facility] Date: 8/8/18 Yes Jennifer Montemayor DO   amphetamine-dextroamphetamine (ADDERALL) 30 MG tablet Take 1 tablet by mouth 2 times daily for 60 doses. [facility] Date: 7/9/18 Yes Jennifer Montemayor DO   amphetamine-dextroamphetamine (ADDERALL) 30 MG tablet Take 1 tablet by mouth 2 times daily for 60 doses. [facility] Date: 9/7/18 Yes Jennifer Montemayor DO   ALPRAZolam Clarice Gerard) 1 MG tablet Take 1 tablet by mouth 3 times daily as needed for Sleep or Anxiety for up to 90 doses. Will Montemayor DO   gabapentin (NEURONTIN) 300 MG capsule Take 1 capsule by mouth 3 times daily for 30 days. Kyra Morales DO     Attestation: The Prescription Monitoring Report for this patient was reviewed today. (Theodore Eisenberg DO)  Documentation: Possible medication side effects, risk of tolerance/dependence & alternative treatments discussed.  Jennifer Montemayor DO)    Vitals:    07/03/18 0748 07/03/18 0819   BP: (!) 142/97 128/76   Pulse: 98    Resp: 20    Temp: 97 °F (36.1 °C) TempSrc: Oral    SpO2: 99%    Weight: 205 lb (93 kg)       Wt Readings from Last 3 Encounters:   07/03/18 205 lb (93 kg)   05/07/18 209 lb 12.8 oz (95.2 kg)   01/29/18 209 lb (94.8 kg)     BP Readings from Last 3 Encounters:   07/03/18 128/76   05/07/18 (!) 124/92   01/29/18 (!) 145/90       Patient Active Problem List   Diagnosis    Brachial plexus injury    Anxiety    History of ADHD    Tobacco use    Anxiety disorder    Cervical strain    Strength loss of left hand    Psychophysiological insomnia    Frequent urination    Elevated blood pressure reading    Acute gastritis without hemorrhage    Lipoma of abdominal wall       Immunization History   Administered Date(s) Administered    Tdap (Boostrix, Adacel) 07/12/2016, 08/10/2017       Past Medical History:   Diagnosis Date    Anxiety     H/O knee surgery     right    H/O shoulder surgery     left shoulder    Hypertension     IBS (irritable bowel syndrome)     MVP (mitral valve prolapse)     Neuropathy ([address]      History reviewed. No pertinent surgical history. Family History   Problem Relation Age of Onset    High Blood Pressure Father     Heart Disease Father     Substance Abuse Father     Diabetes Paternal Grandmother      Social History     Social History    Marital status: Single     Spouse name: N/A    Number of children: N/A    Years of education: N/A     Occupational History    Not on file. Social History Main Topics    Smoking status: Heavy Tobacco Smoker     Packs/day: 0.50     Types: Cigarettes    Smokeless tobacco: Former User      Comment: vaping    Alcohol use Yes      Comment: twice weekly    Drug use: Yes     Types: Opiates       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     Sexual activity: No     Other Topics Concern    Not on file     Social History Narrative    No narrative on file       O: /76   Pulse 98   Temp 97 °F (36.1 °C) (Oral)   Resp 20   Wt 205 lb (93 kg)   SpO2 99%   BMI 28.39 kg/m² Physical Exam  GEN: No acute distress, cooperative, well nourished, alert. HEENT: PEERLA, EOMI , normocephalic/atraumatic, nares and oropharynx clear. Mucus membranes normal, Tympanic membranes clear bilaterally. Neck: soft, supple, no thyromegaly, mass, no Lymphadenopathy  CV: Regular rate and rhythm, no murmur, rubs, gallops. No edema. Resp: Clear to auscultation bilaterally good air entry bilaterally  No crackles, wheeze. Breathing comfortably. Psych: normal affect. Neuro: AOx3      ASSESSMENT   Diagnosis Orders   1. Adult attention deficit disorder  amphetamine-dextroamphetamine (ADDERALL, 30MG,) 30 MG tablet    amphetamine-dextroamphetamine (ADDERALL) 30 MG tablet    amphetamine-dextroamphetamine (ADDERALL) 30 MG tablet    DISCONTINUED: amphetamine-dextroamphetamine (ADDERALL) 30 MG tablet   2. Psychophysiological insomnia     3. Anxiety  ALPRAZolam (XANAX) 1 MG tablet     #1: The current medical regimen is effective;  continue present plan and medications. The risks, benefits, potential side effects and barriers to medication use were addressed today. Understanding was acknowledged. Patient asked to follow-up if condition(s) do not improve as anticipated. Pt aware of need for every 3 month medication followup appointments, and that medication refills for benzodiazepines, narcotics and/or stimulants will only be given at appointment. #3: chaning sig. The risks, benefits, potential side effects and barriers to medication use were addressed today. Understanding was acknowledged. Patient asked to follow-up if condition(s) do not improve as anticipated. New med agreement signed. PLAN          See rest of plan under patient instructions. Return in about 3 months (around 10/3/2018). Patient Instructions   1) Okay to take either Pepcid or Zantac. Avoid Prilosec or Nexium as these can throw off H.pylori.         Please note a portion of this chart was generated using dragon dictation

## 2018-08-20 DIAGNOSIS — F98.8 ADULT ATTENTION DEFICIT DISORDER: ICD-10-CM

## 2018-08-21 RX ORDER — DEXTROAMPHETAMINE SACCHARATE, AMPHETAMINE ASPARTATE, DEXTROAMPHETAMINE SULFATE AND AMPHETAMINE SULFATE 7.5; 7.5; 7.5; 7.5 MG/1; MG/1; MG/1; MG/1
30 TABLET ORAL 2 TIMES DAILY
Qty: 60 TABLET | Refills: 0 | Status: SHIPPED | OUTPATIENT
Start: 2018-08-21 | End: 2018-10-23 | Stop reason: SDUPTHER

## 2018-10-23 ENCOUNTER — OFFICE VISIT (OUTPATIENT)
Dept: FAMILY MEDICINE CLINIC | Age: 38
End: 2018-10-23
Payer: MEDICARE

## 2018-10-23 VITALS
HEART RATE: 112 BPM | HEIGHT: 73 IN | TEMPERATURE: 97 F | BODY MASS INDEX: 26.85 KG/M2 | DIASTOLIC BLOOD PRESSURE: 88 MMHG | OXYGEN SATURATION: 96 % | SYSTOLIC BLOOD PRESSURE: 130 MMHG | WEIGHT: 202.6 LBS

## 2018-10-23 DIAGNOSIS — F98.8 ADULT ATTENTION DEFICIT DISORDER: ICD-10-CM

## 2018-10-23 DIAGNOSIS — M25.511 CHRONIC PAIN OF BOTH SHOULDERS: ICD-10-CM

## 2018-10-23 DIAGNOSIS — G89.29 CHRONIC PAIN OF BOTH SHOULDERS: ICD-10-CM

## 2018-10-23 DIAGNOSIS — M25.512 CHRONIC PAIN OF BOTH SHOULDERS: ICD-10-CM

## 2018-10-23 DIAGNOSIS — F41.9 ANXIETY: ICD-10-CM

## 2018-10-23 DIAGNOSIS — M54.5 CHRONIC RIGHT-SIDED LOW BACK PAIN, WITH SCIATICA PRESENCE UNSPECIFIED: Primary | ICD-10-CM

## 2018-10-23 DIAGNOSIS — G89.29 CHRONIC PAIN OF RIGHT KNEE: ICD-10-CM

## 2018-10-23 DIAGNOSIS — M25.561 CHRONIC PAIN OF RIGHT KNEE: ICD-10-CM

## 2018-10-23 DIAGNOSIS — G89.29 CHRONIC RIGHT-SIDED LOW BACK PAIN, WITH SCIATICA PRESENCE UNSPECIFIED: Primary | ICD-10-CM

## 2018-10-23 PROCEDURE — G8484 FLU IMMUNIZE NO ADMIN: HCPCS | Performed by: FAMILY MEDICINE

## 2018-10-23 PROCEDURE — 99214 OFFICE O/P EST MOD 30 MIN: CPT | Performed by: FAMILY MEDICINE

## 2018-10-23 PROCEDURE — G8427 DOCREV CUR MEDS BY ELIG CLIN: HCPCS | Performed by: FAMILY MEDICINE

## 2018-10-23 PROCEDURE — G8417 CALC BMI ABV UP PARAM F/U: HCPCS | Performed by: FAMILY MEDICINE

## 2018-10-23 PROCEDURE — 4004F PT TOBACCO SCREEN RCVD TLK: CPT | Performed by: FAMILY MEDICINE

## 2018-10-23 RX ORDER — DEXTROAMPHETAMINE SACCHARATE, AMPHETAMINE ASPARTATE, DEXTROAMPHETAMINE SULFATE AND AMPHETAMINE SULFATE 7.5; 7.5; 7.5; 7.5 MG/1; MG/1; MG/1; MG/1
30 TABLET ORAL 2 TIMES DAILY
Qty: 60 TABLET | Refills: 0 | Status: SHIPPED | OUTPATIENT
Start: 2018-11-22 | End: 2019-01-21 | Stop reason: SDUPTHER

## 2018-10-23 RX ORDER — DEXTROAMPHETAMINE SACCHARATE, AMPHETAMINE ASPARTATE, DEXTROAMPHETAMINE SULFATE AND AMPHETAMINE SULFATE 7.5; 7.5; 7.5; 7.5 MG/1; MG/1; MG/1; MG/1
30 TABLET ORAL 2 TIMES DAILY
Qty: 60 TABLET | Refills: 0 | Status: SHIPPED | OUTPATIENT
Start: 2018-12-22 | End: 2019-01-21 | Stop reason: SDUPTHER

## 2018-10-23 RX ORDER — DEXTROAMPHETAMINE SACCHARATE, AMPHETAMINE ASPARTATE, DEXTROAMPHETAMINE SULFATE AND AMPHETAMINE SULFATE 7.5; 7.5; 7.5; 7.5 MG/1; MG/1; MG/1; MG/1
30 TABLET ORAL 2 TIMES DAILY
Qty: 60 TABLET | Refills: 0 | Status: SHIPPED | OUTPATIENT
Start: 2018-10-23 | End: 2019-01-21 | Stop reason: SDUPTHER

## 2018-10-23 RX ORDER — ALPRAZOLAM 1 MG/1
1 TABLET ORAL 3 TIMES DAILY PRN
Qty: 90 TABLET | Refills: 2 | Status: SHIPPED | OUTPATIENT
Start: 2018-10-23 | End: 2018-11-23

## 2018-10-23 RX ORDER — GABAPENTIN 300 MG/1
300 CAPSULE ORAL 3 TIMES DAILY
Qty: 90 CAPSULE | Refills: 2 | Status: SHIPPED | OUTPATIENT
Start: 2018-10-23 | End: 2019-03-27 | Stop reason: SDUPTHER

## 2018-10-23 NOTE — PROGRESS NOTES
clear bilaterally. Neck: soft, supple, no thyromegaly, mass, no Lymphadenopathy  CV: Regular rate and rhythm, no murmur, rubs, gallops. No edema. Resp: Clear to auscultation bilaterally good air entry bilaterally  No crackles, wheeze. Breathing comfortably. Psych: normal affect. Neuro: AOx3  MSK: TTP of right lower back pain. Forward flexion provokes back pain    ASSESSMENT   Diagnosis Orders   1. Chronic right-sided low back pain, with sciatica presence unspecified  MRI LUMBAR SPINE WO CONTRAST   2. Adult attention deficit disorder  amphetamine-dextroamphetamine (ADDERALL, 30MG,) 30 MG tablet    amphetamine-dextroamphetamine (ADDERALL) 30 MG tablet    amphetamine-dextroamphetamine (ADDERALL) 30 MG tablet   3. Chronic pain of right knee  gabapentin (NEURONTIN) 300 MG capsule   4. Chronic pain of both shoulders  gabapentin (NEURONTIN) 300 MG capsule   5. Anxiety  ALPRAZolam (XANAX) 1 MG tablet     #2:The risks, benefits, potential side effects and barriers to medication use were addressed today. Understanding was acknowledged. Patient asked to follow-up if condition(s) do not improve as anticipated. Pt aware of need for every 3 month medication followup appointments, and that medication refills for benzodiazepines, narcotics and/or stimulants will only be given at appointment. #3 and #4: The current medical regimen is effective;  continue present plan and medications. #5: The current medical regimen is effective;  continue present plan and medications. Pt aware of need for every 3 month medication followup appointments, and that medication refills for benzodiazepines, narcotics and/or stimulants will only be given at appointment. PLAN          See rest of plan under patient instructions. Return in about 3 months (around 1/23/2019). There are no Patient Instructions on file for this visit. Please note a portion of this chart was generated using dragon dictation software.  Although every

## 2019-01-21 ENCOUNTER — OFFICE VISIT (OUTPATIENT)
Dept: FAMILY MEDICINE CLINIC | Age: 39
End: 2019-01-21
Payer: MEDICARE

## 2019-01-21 VITALS
HEART RATE: 109 BPM | TEMPERATURE: 97.6 F | RESPIRATION RATE: 12 BRPM | SYSTOLIC BLOOD PRESSURE: 132 MMHG | DIASTOLIC BLOOD PRESSURE: 90 MMHG | WEIGHT: 220.2 LBS | BODY MASS INDEX: 29.05 KG/M2 | OXYGEN SATURATION: 98 %

## 2019-01-21 DIAGNOSIS — F43.21 GRIEVING: ICD-10-CM

## 2019-01-21 DIAGNOSIS — H65.191 OTHER ACUTE NONSUPPURATIVE OTITIS MEDIA OF RIGHT EAR, RECURRENCE NOT SPECIFIED: ICD-10-CM

## 2019-01-21 DIAGNOSIS — F41.9 ANXIETY: ICD-10-CM

## 2019-01-21 DIAGNOSIS — F98.8 ADULT ATTENTION DEFICIT DISORDER: Primary | ICD-10-CM

## 2019-01-21 PROCEDURE — G8427 DOCREV CUR MEDS BY ELIG CLIN: HCPCS | Performed by: FAMILY MEDICINE

## 2019-01-21 PROCEDURE — 4004F PT TOBACCO SCREEN RCVD TLK: CPT | Performed by: FAMILY MEDICINE

## 2019-01-21 PROCEDURE — G8484 FLU IMMUNIZE NO ADMIN: HCPCS | Performed by: FAMILY MEDICINE

## 2019-01-21 PROCEDURE — G8417 CALC BMI ABV UP PARAM F/U: HCPCS | Performed by: FAMILY MEDICINE

## 2019-01-21 PROCEDURE — 99214 OFFICE O/P EST MOD 30 MIN: CPT | Performed by: FAMILY MEDICINE

## 2019-01-21 RX ORDER — DEXTROAMPHETAMINE SACCHARATE, AMPHETAMINE ASPARTATE, DEXTROAMPHETAMINE SULFATE AND AMPHETAMINE SULFATE 7.5; 7.5; 7.5; 7.5 MG/1; MG/1; MG/1; MG/1
30 TABLET ORAL 2 TIMES DAILY
Qty: 60 TABLET | Refills: 0 | Status: SHIPPED | OUTPATIENT
Start: 2019-03-22 | End: 2019-03-27 | Stop reason: SDUPTHER

## 2019-01-21 RX ORDER — DEXTROAMPHETAMINE SACCHARATE, AMPHETAMINE ASPARTATE, DEXTROAMPHETAMINE SULFATE AND AMPHETAMINE SULFATE 7.5; 7.5; 7.5; 7.5 MG/1; MG/1; MG/1; MG/1
30 TABLET ORAL 2 TIMES DAILY
Qty: 60 TABLET | Refills: 0 | Status: SHIPPED | OUTPATIENT
Start: 2019-02-20 | End: 2019-03-27 | Stop reason: SDUPTHER

## 2019-01-21 RX ORDER — AZITHROMYCIN 250 MG/1
250 TABLET, FILM COATED ORAL SEE ADMIN INSTRUCTIONS
Qty: 6 TABLET | Refills: 0 | Status: SHIPPED | OUTPATIENT
Start: 2019-01-21 | End: 2019-01-26

## 2019-01-21 RX ORDER — DEXTROAMPHETAMINE SACCHARATE, AMPHETAMINE ASPARTATE, DEXTROAMPHETAMINE SULFATE AND AMPHETAMINE SULFATE 7.5; 7.5; 7.5; 7.5 MG/1; MG/1; MG/1; MG/1
30 TABLET ORAL 2 TIMES DAILY
Qty: 60 TABLET | Refills: 0 | Status: SHIPPED | OUTPATIENT
Start: 2019-01-21 | End: 2019-03-27 | Stop reason: SDUPTHER

## 2019-01-21 RX ORDER — ALPRAZOLAM 1 MG/1
1 TABLET ORAL 3 TIMES DAILY PRN
COMMUNITY
End: 2019-01-21 | Stop reason: SDUPTHER

## 2019-01-21 RX ORDER — ALPRAZOLAM 1 MG/1
1 TABLET ORAL 3 TIMES DAILY PRN
Qty: 90 TABLET | Refills: 2 | Status: SHIPPED | OUTPATIENT
Start: 2019-01-21 | End: 2019-02-20

## 2019-01-21 ASSESSMENT — PATIENT HEALTH QUESTIONNAIRE - PHQ9
SUM OF ALL RESPONSES TO PHQ QUESTIONS 1-9: 1
1. LITTLE INTEREST OR PLEASURE IN DOING THINGS: 0
2. FEELING DOWN, DEPRESSED OR HOPELESS: 1
SUM OF ALL RESPONSES TO PHQ QUESTIONS 1-9: 1
SUM OF ALL RESPONSES TO PHQ9 QUESTIONS 1 & 2: 1

## 2019-01-25 LAB
6-ACETYLMORPHINE: NOT DETECTED
7-AMINOCLONAZEPAM: NOT DETECTED
ALPHA-OH-ALPRAZOLAM: PRESENT
ALPRAZOLAM: PRESENT
AMPHETAMINE: PRESENT
BARBITURATES: NOT DETECTED
BENZOYLECGONINE: NOT DETECTED
BUPRENORPHINE: NOT DETECTED
CARISOPRODOL: NOT DETECTED
CLONAZEPAM: NOT DETECTED
CODEINE: PRESENT
CREATININE URINE: 138.6 MG/DL (ref 20–400)
DIAZEPAM: NOT DETECTED
DRUGS EXPECTED: NORMAL
EER PAIN MGT DRUG PANEL, HIGH RES/EMIT U: NORMAL
ETHYL GLUCURONIDE: PRESENT
FENTANYL: NOT DETECTED
HYDROCODONE: NOT DETECTED
HYDROMORPHONE: NOT DETECTED
LORAZEPAM: NOT DETECTED
MARIJUANA METABOLITE: PRESENT
MDA: NOT DETECTED
MDEA: NOT DETECTED
MDMA URINE: NOT DETECTED
MEPERIDINE: NOT DETECTED
METHADONE: NOT DETECTED
METHAMPHETAMINE: NOT DETECTED
METHYLPHENIDATE: NOT DETECTED
MIDAZOLAM: NOT DETECTED
MORPHINE: NOT DETECTED
NORBUPRENORPHINE, FREE: NOT DETECTED
NORDIAZEPAM: NOT DETECTED
NORFENTANYL: NOT DETECTED
NORHYDROCODONE, URINE: NOT DETECTED
NOROXYCODONE: PRESENT
NOROXYMORPHONE, URINE: NOT DETECTED
OXAZEPAM: NOT DETECTED
OXYCODONE: PRESENT
OXYMORPHONE: NOT DETECTED
PAIN MANAGEMENT DRUG PANEL: NORMAL
PAIN MANAGEMENT DRUG PANEL: NORMAL
PCP: NOT DETECTED
PHENTERMINE: NOT DETECTED
PROPOXYPHENE: NOT DETECTED
TAPENTADOL, URINE: NOT DETECTED
TAPENTADOL-O-SULFATE, URINE: NOT DETECTED
TEMAZEPAM: NOT DETECTED
TRAMADOL: PRESENT
ZOLPIDEM: NOT DETECTED

## 2019-03-27 ENCOUNTER — OFFICE VISIT (OUTPATIENT)
Dept: FAMILY MEDICINE CLINIC | Age: 39
End: 2019-03-27
Payer: MEDICARE

## 2019-03-27 VITALS
WEIGHT: 222.8 LBS | DIASTOLIC BLOOD PRESSURE: 92 MMHG | HEART RATE: 100 BPM | RESPIRATION RATE: 21 BRPM | OXYGEN SATURATION: 100 % | TEMPERATURE: 96.3 F | SYSTOLIC BLOOD PRESSURE: 128 MMHG | BODY MASS INDEX: 29.39 KG/M2

## 2019-03-27 DIAGNOSIS — M25.561 CHRONIC PAIN OF RIGHT KNEE: ICD-10-CM

## 2019-03-27 DIAGNOSIS — F41.9 ANXIETY: Primary | ICD-10-CM

## 2019-03-27 DIAGNOSIS — M25.512 CHRONIC PAIN OF BOTH SHOULDERS: ICD-10-CM

## 2019-03-27 DIAGNOSIS — G89.29 CHRONIC PAIN OF BOTH SHOULDERS: ICD-10-CM

## 2019-03-27 DIAGNOSIS — M25.511 CHRONIC PAIN OF BOTH SHOULDERS: ICD-10-CM

## 2019-03-27 DIAGNOSIS — F98.8 ADULT ATTENTION DEFICIT DISORDER: ICD-10-CM

## 2019-03-27 DIAGNOSIS — G89.29 CHRONIC PAIN OF RIGHT KNEE: ICD-10-CM

## 2019-03-27 PROCEDURE — 99214 OFFICE O/P EST MOD 30 MIN: CPT | Performed by: FAMILY MEDICINE

## 2019-03-27 PROCEDURE — G8427 DOCREV CUR MEDS BY ELIG CLIN: HCPCS | Performed by: FAMILY MEDICINE

## 2019-03-27 PROCEDURE — G8484 FLU IMMUNIZE NO ADMIN: HCPCS | Performed by: FAMILY MEDICINE

## 2019-03-27 PROCEDURE — G8417 CALC BMI ABV UP PARAM F/U: HCPCS | Performed by: FAMILY MEDICINE

## 2019-03-27 PROCEDURE — 4004F PT TOBACCO SCREEN RCVD TLK: CPT | Performed by: FAMILY MEDICINE

## 2019-03-27 RX ORDER — DEXTROAMPHETAMINE SACCHARATE, AMPHETAMINE ASPARTATE, DEXTROAMPHETAMINE SULFATE AND AMPHETAMINE SULFATE 7.5; 7.5; 7.5; 7.5 MG/1; MG/1; MG/1; MG/1
30 TABLET ORAL 2 TIMES DAILY
Qty: 60 TABLET | Refills: 0 | Status: SHIPPED | OUTPATIENT
Start: 2019-05-26 | End: 2019-05-29 | Stop reason: SDUPTHER

## 2019-03-27 RX ORDER — ALPRAZOLAM 1 MG/1
1 TABLET ORAL 3 TIMES DAILY
Qty: 90 TABLET | Refills: 0 | Status: SHIPPED | OUTPATIENT
Start: 2019-03-27 | End: 2019-04-26

## 2019-03-27 RX ORDER — ALPRAZOLAM 1 MG/1
1 TABLET ORAL 3 TIMES DAILY
COMMUNITY
End: 2019-03-27 | Stop reason: SDUPTHER

## 2019-03-27 RX ORDER — DEXTROAMPHETAMINE SACCHARATE, AMPHETAMINE ASPARTATE, DEXTROAMPHETAMINE SULFATE AND AMPHETAMINE SULFATE 7.5; 7.5; 7.5; 7.5 MG/1; MG/1; MG/1; MG/1
30 TABLET ORAL 2 TIMES DAILY
Qty: 60 TABLET | Refills: 0 | Status: SHIPPED | OUTPATIENT
Start: 2019-03-27 | End: 2019-06-27

## 2019-03-27 RX ORDER — GABAPENTIN 300 MG/1
300 CAPSULE ORAL 3 TIMES DAILY
Qty: 90 CAPSULE | Refills: 2 | Status: SHIPPED | OUTPATIENT
Start: 2019-03-27 | End: 2019-06-27 | Stop reason: SDUPTHER

## 2019-03-27 RX ORDER — DEXTROAMPHETAMINE SACCHARATE, AMPHETAMINE ASPARTATE, DEXTROAMPHETAMINE SULFATE AND AMPHETAMINE SULFATE 7.5; 7.5; 7.5; 7.5 MG/1; MG/1; MG/1; MG/1
30 TABLET ORAL 2 TIMES DAILY
Qty: 60 TABLET | Refills: 0 | Status: SHIPPED | OUTPATIENT
Start: 2019-04-26 | End: 2019-06-27 | Stop reason: SDUPTHER

## 2019-05-28 DIAGNOSIS — F41.9 ANXIETY: Primary | ICD-10-CM

## 2019-05-28 DIAGNOSIS — F98.8 ADULT ATTENTION DEFICIT DISORDER: ICD-10-CM

## 2019-05-28 RX ORDER — DEXTROAMPHETAMINE SACCHARATE, AMPHETAMINE ASPARTATE, DEXTROAMPHETAMINE SULFATE AND AMPHETAMINE SULFATE 7.5; 7.5; 7.5; 7.5 MG/1; MG/1; MG/1; MG/1
30 TABLET ORAL 2 TIMES DAILY
Qty: 60 TABLET | Refills: 0 | Status: CANCELLED | OUTPATIENT
Start: 2019-05-28 | End: 2019-06-27

## 2019-05-29 RX ORDER — ALPRAZOLAM 1 MG/1
1 TABLET ORAL 3 TIMES DAILY
Qty: 90 TABLET | Refills: 0 | Status: SHIPPED | OUTPATIENT
Start: 2019-05-29 | End: 2019-06-27 | Stop reason: SDUPTHER

## 2019-05-29 RX ORDER — DEXTROAMPHETAMINE SACCHARATE, AMPHETAMINE ASPARTATE, DEXTROAMPHETAMINE SULFATE AND AMPHETAMINE SULFATE 7.5; 7.5; 7.5; 7.5 MG/1; MG/1; MG/1; MG/1
30 TABLET ORAL 2 TIMES DAILY
Qty: 60 TABLET | Refills: 0 | Status: SHIPPED | OUTPATIENT
Start: 2019-05-29 | End: 2019-06-27

## 2019-06-27 ENCOUNTER — OFFICE VISIT (OUTPATIENT)
Dept: FAMILY MEDICINE CLINIC | Age: 39
End: 2019-06-27
Payer: MEDICARE

## 2019-06-27 VITALS
OXYGEN SATURATION: 98 % | HEART RATE: 121 BPM | BODY MASS INDEX: 29.31 KG/M2 | HEIGHT: 72 IN | SYSTOLIC BLOOD PRESSURE: 138 MMHG | WEIGHT: 216.4 LBS | DIASTOLIC BLOOD PRESSURE: 95 MMHG | TEMPERATURE: 98.2 F

## 2019-06-27 DIAGNOSIS — M25.512 CHRONIC PAIN OF BOTH SHOULDERS: ICD-10-CM

## 2019-06-27 DIAGNOSIS — G89.29 CHRONIC PAIN OF RIGHT KNEE: ICD-10-CM

## 2019-06-27 DIAGNOSIS — F41.9 ANXIETY: ICD-10-CM

## 2019-06-27 DIAGNOSIS — M25.561 CHRONIC PAIN OF RIGHT KNEE: ICD-10-CM

## 2019-06-27 DIAGNOSIS — M25.511 CHRONIC PAIN OF BOTH SHOULDERS: ICD-10-CM

## 2019-06-27 DIAGNOSIS — G89.29 CHRONIC PAIN OF BOTH SHOULDERS: ICD-10-CM

## 2019-06-27 DIAGNOSIS — F98.8 ADULT ATTENTION DEFICIT DISORDER: ICD-10-CM

## 2019-06-27 PROBLEM — M54.9 CHRONIC BACK PAIN: Status: ACTIVE | Noted: 2019-06-27

## 2019-06-27 PROBLEM — K51.90 ULCERATIVE COLITIS (HCC): Status: ACTIVE | Noted: 2019-06-27

## 2019-06-27 PROCEDURE — 4004F PT TOBACCO SCREEN RCVD TLK: CPT | Performed by: FAMILY MEDICINE

## 2019-06-27 PROCEDURE — G8427 DOCREV CUR MEDS BY ELIG CLIN: HCPCS | Performed by: FAMILY MEDICINE

## 2019-06-27 PROCEDURE — G8417 CALC BMI ABV UP PARAM F/U: HCPCS | Performed by: FAMILY MEDICINE

## 2019-06-27 PROCEDURE — 99214 OFFICE O/P EST MOD 30 MIN: CPT | Performed by: FAMILY MEDICINE

## 2019-06-27 RX ORDER — IBUPROFEN 800 MG/1
800 TABLET ORAL
COMMUNITY
Start: 2018-06-18 | End: 2019-06-27

## 2019-06-27 RX ORDER — GABAPENTIN 300 MG/1
CAPSULE ORAL
Refills: 2 | COMMUNITY
Start: 2019-05-30 | End: 2020-02-21 | Stop reason: SDUPTHER

## 2019-06-27 RX ORDER — HYDROCODONE BITARTRATE AND ACETAMINOPHEN 5; 325 MG/1; MG/1
TABLET ORAL
Refills: 0 | COMMUNITY
Start: 2019-05-01 | End: 2019-06-27

## 2019-06-27 RX ORDER — DEXTROAMPHETAMINE SACCHARATE, AMPHETAMINE ASPARTATE, DEXTROAMPHETAMINE SULFATE AND AMPHETAMINE SULFATE 7.5; 7.5; 7.5; 7.5 MG/1; MG/1; MG/1; MG/1
30 TABLET ORAL 2 TIMES DAILY
Qty: 60 TABLET | Refills: 0 | Status: SHIPPED | OUTPATIENT
Start: 2019-06-27 | End: 2019-08-21 | Stop reason: SDUPTHER

## 2019-06-27 RX ORDER — DEXTROAMPHETAMINE SACCHARATE, AMPHETAMINE ASPARTATE, DEXTROAMPHETAMINE SULFATE AND AMPHETAMINE SULFATE 7.5; 7.5; 7.5; 7.5 MG/1; MG/1; MG/1; MG/1
30 TABLET ORAL 2 TIMES DAILY
Qty: 60 TABLET | Refills: 0 | Status: SHIPPED | OUTPATIENT
Start: 2019-07-27 | End: 2019-08-21 | Stop reason: SDUPTHER

## 2019-06-27 RX ORDER — DEXTROAMPHETAMINE SACCHARATE, AMPHETAMINE ASPARTATE, DEXTROAMPHETAMINE SULFATE AND AMPHETAMINE SULFATE 7.5; 7.5; 7.5; 7.5 MG/1; MG/1; MG/1; MG/1
30 TABLET ORAL 2 TIMES DAILY
Qty: 60 TABLET | Refills: 0 | Status: SHIPPED | OUTPATIENT
Start: 2019-08-26 | End: 2019-08-21 | Stop reason: SDUPTHER

## 2019-06-27 RX ORDER — ALPRAZOLAM 1 MG/1
1 TABLET ORAL 3 TIMES DAILY
Qty: 90 TABLET | Refills: 2 | Status: SHIPPED | OUTPATIENT
Start: 2019-06-27 | End: 2019-08-21 | Stop reason: SDUPTHER

## 2019-06-27 RX ORDER — GABAPENTIN 300 MG/1
300 CAPSULE ORAL 3 TIMES DAILY
Qty: 90 CAPSULE | Refills: 2 | Status: SHIPPED | OUTPATIENT
Start: 2019-06-27 | End: 2019-08-21 | Stop reason: SDUPTHER
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
## 2019-08-20 ENCOUNTER — TELEPHONE (OUTPATIENT)
Dept: FAMILY MEDICINE CLINIC | Age: 39
End: 2019-08-20

## 2019-08-21 ENCOUNTER — OFFICE VISIT (OUTPATIENT)
Dept: FAMILY MEDICINE CLINIC | Age: 39
End: 2019-08-21
Payer: MEDICARE

## 2019-08-21 VITALS
WEIGHT: 217.2 LBS | OXYGEN SATURATION: 95 % | SYSTOLIC BLOOD PRESSURE: 130 MMHG | BODY MASS INDEX: 29.46 KG/M2 | HEART RATE: 109 BPM | DIASTOLIC BLOOD PRESSURE: 86 MMHG | RESPIRATION RATE: 18 BRPM | TEMPERATURE: 97.5 F

## 2019-08-21 DIAGNOSIS — G89.29 CHRONIC PAIN OF RIGHT KNEE: ICD-10-CM

## 2019-08-21 DIAGNOSIS — M25.561 CHRONIC PAIN OF RIGHT KNEE: ICD-10-CM

## 2019-08-21 DIAGNOSIS — F98.8 ADULT ATTENTION DEFICIT DISORDER: ICD-10-CM

## 2019-08-21 DIAGNOSIS — M25.512 CHRONIC PAIN OF BOTH SHOULDERS: ICD-10-CM

## 2019-08-21 DIAGNOSIS — G89.29 CHRONIC PAIN OF BOTH SHOULDERS: ICD-10-CM

## 2019-08-21 DIAGNOSIS — F41.9 ANXIETY: ICD-10-CM

## 2019-08-21 DIAGNOSIS — M25.511 CHRONIC PAIN OF BOTH SHOULDERS: ICD-10-CM

## 2019-08-21 PROCEDURE — 99214 OFFICE O/P EST MOD 30 MIN: CPT | Performed by: FAMILY MEDICINE

## 2019-08-21 PROCEDURE — G8427 DOCREV CUR MEDS BY ELIG CLIN: HCPCS | Performed by: FAMILY MEDICINE

## 2019-08-21 PROCEDURE — 4004F PT TOBACCO SCREEN RCVD TLK: CPT | Performed by: FAMILY MEDICINE

## 2019-08-21 PROCEDURE — G8417 CALC BMI ABV UP PARAM F/U: HCPCS | Performed by: FAMILY MEDICINE

## 2019-08-21 RX ORDER — GABAPENTIN 300 MG/1
900 CAPSULE ORAL 2 TIMES DAILY
Qty: 180 CAPSULE | Refills: 2 | Status: SHIPPED | OUTPATIENT
Start: 2019-08-21 | End: 2022-03-03

## 2019-08-21 RX ORDER — DEXTROAMPHETAMINE SACCHARATE, AMPHETAMINE ASPARTATE, DEXTROAMPHETAMINE SULFATE AND AMPHETAMINE SULFATE 7.5; 7.5; 7.5; 7.5 MG/1; MG/1; MG/1; MG/1
TABLET ORAL
Qty: 60 TABLET | Refills: 0 | Status: SHIPPED | OUTPATIENT
Start: 2019-08-21 | End: 2019-12-02 | Stop reason: SDUPTHER

## 2019-08-21 RX ORDER — DEXTROAMPHETAMINE SACCHARATE, AMPHETAMINE ASPARTATE, DEXTROAMPHETAMINE SULFATE AND AMPHETAMINE SULFATE 7.5; 7.5; 7.5; 7.5 MG/1; MG/1; MG/1; MG/1
TABLET ORAL
Qty: 60 TABLET | Refills: 0 | Status: SHIPPED | OUTPATIENT
Start: 2019-09-20 | End: 2019-12-02 | Stop reason: SDUPTHER

## 2019-08-21 RX ORDER — ALPRAZOLAM 1 MG/1
1 TABLET ORAL 3 TIMES DAILY PRN
Qty: 90 TABLET | Refills: 2 | Status: SHIPPED | OUTPATIENT
Start: 2019-08-21 | End: 2019-11-27 | Stop reason: SDUPTHER

## 2019-08-21 RX ORDER — DEXTROAMPHETAMINE SACCHARATE, AMPHETAMINE ASPARTATE, DEXTROAMPHETAMINE SULFATE AND AMPHETAMINE SULFATE 7.5; 7.5; 7.5; 7.5 MG/1; MG/1; MG/1; MG/1
TABLET ORAL
Qty: 60 TABLET | Refills: 0 | Status: SHIPPED | OUTPATIENT
Start: 2019-10-20 | End: 2019-11-27 | Stop reason: SDUPTHER

## 2019-08-21 NOTE — PROGRESS NOTES
[facility]  Progress Note  Erich Maldonado DO Leonard Godoy  [birth year]    08/25/19    Chief Complaint:   Leonard Godoy is a 45 y.o. male who is here for ADD and anxiety and chronic pain    HPI:   Doing well with ADD medication. Denies palpitations, headaches or insomnia. Denies increased anxiety while on medication. Continues benzodiazepines. Will be going through [address] [place] [address] for outpatient treatment. Chronic pain worse and requests increase in dosage. ROS negative for headache, vision changes, chest pain, shortness of breath, abdominal pain, urinary sx, bowel changes. Past medical, surgical, and social history reviewed. Medications and allergies reviewed. Allergies   Allergen Reactions    Lactose Intolerance (Gi) Nausea Only     Prior to Visit Medications    Medication Sig Taking? Authorizing Provider   amphetamine-dextroamphetamine (ADDERALL) 30 MG tablet Take 1 tab po qAM and 1 tab po mid-day. Yes Edgardo Alex Binrajni, DO   amphetamine-dextroamphetamine (ADDERALL, 30MG,) 30 MG tablet Take 1 tab po qAM and 1 tab po mid-day. Yes Edgardo Creed Bingcang, DO   amphetamine-dextroamphetamine (ADDERALL) 30 MG tablet Take 1 tab po qAM and 1 tab po mid-day. Yes Edgardo Creed Roderickgcarodrigo, DO   ALPRAZolam Darshana Gracie) 1 MG tablet Take 1 tablet by mouth 3 times daily as needed for Sleep for up to 30 days. Yes Edgardo Montemayor, DO   gabapentin (NEURONTIN) 300 MG capsule Take 3 capsules by mouth 2 times daily for 30 days.  Yes Edgardo Montemayor, DO   gabapentin (NEURONTIN) 300 MG capsule TK 1 C PO TID Yes Historical Provider, MD          Vitals:    08/21/19 1209 08/21/19 1220   BP: (!) 153/100 130/86   Site: Left Upper Arm Right Upper Arm   Position: Sitting Supine   Cuff Size: Medium Adult Medium Adult   Pulse: 109    Resp: 18    Temp: 97.5 °F (36.4 °C)    TempSrc: Oral    SpO2: 95%    Weight: 217 lb 3.2 oz (98.5 kg)       Wt Readings from Last 3 Encounters:   08/21/19 217 lb 3.2 oz (98.5 kg)   06/27/19 216 lb 6.4 oz (98.2 kg)   03/27/19 222 lb 12.8 oz (101.1 kg)     BP Readings from Last 3 Encounters:   08/21/19 130/86   06/27/19 (!) 138/95   03/27/19 (!) 128/92       Patient Active Problem List   Diagnosis    Brachial plexus injury    Anxiety    History of ADHD    Tobacco use    Anxiety disorder    Cervical strain    Strength loss of left hand    Psychophysiological insomnia    Frequent urination    Elevated blood pressure reading    Acute gastritis without hemorrhage    Lipoma of abdominal wall    Chronic back pain    Opiate dependence ([facility] [address]    Ulcerative colitis ([facility] [facility]       Immunization History   Administered Date(s) Administered    Tdap (Boostrix, Adacel) 07/12/2016, 08/10/2017       Past Medical History:   Diagnosis Date    Anxiety     Chronic back pain 6/27/2019    H/O knee surgery     right    H/O shoulder surgery     left shoulder    Hypertension     IBS (irritable bowel syndrome)     MVP (mitral valve prolapse)     Neuropathy      No past surgical history on file. Family History   Problem Relation Age of Onset    High Blood Pressure Father     Heart Disease Father     Substance Abuse Father     Diabetes Paternal Grandmother      Social History     Socioeconomic History    Marital status: Single     Spouse name: Not on file    Number of children: Not on file    Years of education: Not on file    Highest education level: Not on file   Occupational History    Not on file   Social Needs    Financial resource strain: Not on file    Food insecurity:     Worry: Not on file     Inability: Not on file    Transportation needs:     Medical: Not on file     Non-medical: Not on file   Tobacco Use    Smoking status: Heavy Tobacco Smoker     Packs/day: 1.00     Years: 22.00     Pack years: 22.00     Types: Cigarettes    Smokeless tobacco: Former User   Substance and Sexual Activity    Alcohol use: Yes     Comment: twice weekly    Drug use:  Yes Types: Opiates      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  Sexual activity: Never   Lifestyle    Physical activity:     Days per week: Not on file     Minutes per session: Not on file    Stress: Not on file   Relationships    Social connections:     Talks on phone: Not on file     Gets together: Not on file     Attends Worship service: Not on file     Active member of club or organization: Not on file     Attends meetings of clubs or organizations: Not on file     Relationship status: Not on file    Intimate partner violence:     Fear of current or ex partner: Not on file     Emotionally abused: Not on file     Physically abused: Not on file     Forced sexual activity: Not on file   Other Topics Concern    Not on file   Social History Narrative    Not on file       O: /86 (Site: Right Upper Arm, Position: Supine, Cuff Size: Medium Adult)   Pulse 109   Temp 97.5 °F (36.4 °C) (Oral)   Resp 18   Wt 217 lb 3.2 oz (98.5 kg)   SpO2 95%   BMI 29.46 kg/m²   Physical Exam  GEN: No acute distress, cooperative, well nourished, alert. HEENT: PEERLA, EOMI , normocephalic/atraumatic, nares and oropharynx clear. Mucus membranes normal, Tympanic membranes clear bilaterally. Neck: soft, supple, no thyromegaly, mass, no Lymphadenopathy  CV: Regular rate and rhythm, no murmur, rubs, gallops. No edema. Resp: Clear to auscultation bilaterally good air entry bilaterally  No crackles, wheeze. Breathing comfortably. Psych: normal affect. Denies SI/HI  Neuro: AOx3      ASSESSMENT   Diagnosis Orders   1. Adult attention deficit disorder  amphetamine-dextroamphetamine (ADDERALL) 30 MG tablet    amphetamine-dextroamphetamine (ADDERALL, 30MG,) 30 MG tablet    amphetamine-dextroamphetamine (ADDERALL) 30 MG tablet   2. Anxiety  ALPRAZolam (XANAX) 1 MG tablet   3. Chronic pain of right knee  gabapentin (NEURONTIN) 300 MG capsule   4.  Chronic pain of both shoulders  gabapentin (NEURONTIN) 300 MG capsule     #1 and #2: Pt aware of need for every 3 month medication followup appointments, and that medication refills for benzodiazepines, narcotics and/or stimulants will only be given at appointment. The risks, benefits, potential side effects and barriers to medication use were addressed today. Understanding was acknowledged. Patient asked to follow-up if condition(s) do not improve as anticipated. The current medical regimen is effective;  continue present plan and medications. #3 and #4: increase dose. The risks, benefits, potential side effects and barriers to medication use were addressed today. Understanding was acknowledged. Patient asked to follow-up if condition(s) do not improve as anticipated. [facility] PLAN          If applicable, see additional patient information and instructions under \"Patient Instructions. \"    Return in about 3 months (around 11/21/2019). There are no Patient Instructions on file for this visit. Please note a portion of this chart was generated using dragon dictation software. Although every effort was made to ensure the accuracy of this automated transcription, some errors in transcription may have occurred.

## 2019-11-26 DIAGNOSIS — F41.9 ANXIETY: ICD-10-CM

## 2019-11-26 DIAGNOSIS — F98.8 ADULT ATTENTION DEFICIT DISORDER: ICD-10-CM

## 2019-11-27 RX ORDER — DEXTROAMPHETAMINE SACCHARATE, AMPHETAMINE ASPARTATE, DEXTROAMPHETAMINE SULFATE AND AMPHETAMINE SULFATE 7.5; 7.5; 7.5; 7.5 MG/1; MG/1; MG/1; MG/1
TABLET ORAL
Qty: 14 TABLET | Refills: 0 | Status: SHIPPED | OUTPATIENT
Start: 2019-11-27 | End: 2019-12-02 | Stop reason: SDUPTHER

## 2019-11-27 RX ORDER — ALPRAZOLAM 1 MG/1
1 TABLET ORAL 3 TIMES DAILY PRN
Qty: 21 TABLET | Refills: 0 | Status: SHIPPED | OUTPATIENT
Start: 2019-11-27 | End: 2019-12-02 | Stop reason: SDUPTHER

## 2019-12-02 ENCOUNTER — OFFICE VISIT (OUTPATIENT)
Dept: FAMILY MEDICINE CLINIC | Age: 39
End: 2019-12-02
Payer: MEDICARE

## 2019-12-02 VITALS
RESPIRATION RATE: 12 BRPM | OXYGEN SATURATION: 97 % | BODY MASS INDEX: 31.57 KG/M2 | DIASTOLIC BLOOD PRESSURE: 83 MMHG | HEART RATE: 91 BPM | WEIGHT: 232.8 LBS | SYSTOLIC BLOOD PRESSURE: 129 MMHG

## 2019-12-02 DIAGNOSIS — F41.9 ANXIETY: ICD-10-CM

## 2019-12-02 DIAGNOSIS — F98.8 ADULT ATTENTION DEFICIT DISORDER: ICD-10-CM

## 2019-12-02 PROCEDURE — G8484 FLU IMMUNIZE NO ADMIN: HCPCS | Performed by: FAMILY MEDICINE

## 2019-12-02 PROCEDURE — G8417 CALC BMI ABV UP PARAM F/U: HCPCS | Performed by: FAMILY MEDICINE

## 2019-12-02 PROCEDURE — G8427 DOCREV CUR MEDS BY ELIG CLIN: HCPCS | Performed by: FAMILY MEDICINE

## 2019-12-02 PROCEDURE — 99214 OFFICE O/P EST MOD 30 MIN: CPT | Performed by: FAMILY MEDICINE

## 2019-12-02 PROCEDURE — 4004F PT TOBACCO SCREEN RCVD TLK: CPT | Performed by: FAMILY MEDICINE

## 2019-12-02 RX ORDER — DEXTROAMPHETAMINE SACCHARATE, AMPHETAMINE ASPARTATE, DEXTROAMPHETAMINE SULFATE AND AMPHETAMINE SULFATE 7.5; 7.5; 7.5; 7.5 MG/1; MG/1; MG/1; MG/1
TABLET ORAL
Qty: 60 TABLET | Refills: 0 | Status: SHIPPED | OUTPATIENT
Start: 2020-01-02 | End: 2020-02-21 | Stop reason: SDUPTHER

## 2019-12-02 RX ORDER — DEXTROAMPHETAMINE SACCHARATE, AMPHETAMINE ASPARTATE, DEXTROAMPHETAMINE SULFATE AND AMPHETAMINE SULFATE 7.5; 7.5; 7.5; 7.5 MG/1; MG/1; MG/1; MG/1
TABLET ORAL
Qty: 60 TABLET | Refills: 0 | Status: SHIPPED | OUTPATIENT
Start: 2020-02-02 | End: 2020-02-21 | Stop reason: SDUPTHER

## 2019-12-02 RX ORDER — DEXTROAMPHETAMINE SACCHARATE, AMPHETAMINE ASPARTATE, DEXTROAMPHETAMINE SULFATE AND AMPHETAMINE SULFATE 7.5; 7.5; 7.5; 7.5 MG/1; MG/1; MG/1; MG/1
TABLET ORAL
Qty: 60 TABLET | Refills: 0 | Status: SHIPPED | OUTPATIENT
Start: 2019-12-02 | End: 2020-02-21 | Stop reason: SDUPTHER

## 2019-12-02 RX ORDER — ALPRAZOLAM 1 MG/1
1 TABLET ORAL 3 TIMES DAILY PRN
Qty: 90 TABLET | Refills: 2 | Status: SHIPPED | OUTPATIENT
Start: 2019-12-02 | End: 2020-02-21 | Stop reason: SDUPTHER

## 2020-01-07 ENCOUNTER — TELEPHONE (OUTPATIENT)
Dept: FAMILY MEDICINE CLINIC | Age: 40
End: 2020-01-07

## 2020-02-21 ENCOUNTER — OFFICE VISIT (OUTPATIENT)
Dept: FAMILY MEDICINE CLINIC | Age: 40
End: 2020-02-21
Payer: MEDICARE

## 2020-02-21 VITALS
TEMPERATURE: 97.5 F | RESPIRATION RATE: 12 BRPM | SYSTOLIC BLOOD PRESSURE: 126 MMHG | HEART RATE: 109 BPM | BODY MASS INDEX: 31.9 KG/M2 | OXYGEN SATURATION: 94 % | WEIGHT: 235.2 LBS | DIASTOLIC BLOOD PRESSURE: 86 MMHG

## 2020-02-21 PROCEDURE — G8427 DOCREV CUR MEDS BY ELIG CLIN: HCPCS | Performed by: FAMILY MEDICINE

## 2020-02-21 PROCEDURE — 4004F PT TOBACCO SCREEN RCVD TLK: CPT | Performed by: FAMILY MEDICINE

## 2020-02-21 PROCEDURE — G8484 FLU IMMUNIZE NO ADMIN: HCPCS | Performed by: FAMILY MEDICINE

## 2020-02-21 PROCEDURE — 99214 OFFICE O/P EST MOD 30 MIN: CPT | Performed by: FAMILY MEDICINE

## 2020-02-21 PROCEDURE — 90471 IMMUNIZATION ADMIN: CPT | Performed by: FAMILY MEDICINE

## 2020-02-21 PROCEDURE — 90651 9VHPV VACCINE 2/3 DOSE IM: CPT | Performed by: FAMILY MEDICINE

## 2020-02-21 PROCEDURE — G8417 CALC BMI ABV UP PARAM F/U: HCPCS | Performed by: FAMILY MEDICINE

## 2020-02-21 RX ORDER — DEXTROAMPHETAMINE SACCHARATE, AMPHETAMINE ASPARTATE, DEXTROAMPHETAMINE SULFATE AND AMPHETAMINE SULFATE 7.5; 7.5; 7.5; 7.5 MG/1; MG/1; MG/1; MG/1
TABLET ORAL
Qty: 60 TABLET | Refills: 0 | Status: SHIPPED | OUTPATIENT
Start: 2020-05-03 | End: 2020-05-28 | Stop reason: SDUPTHER

## 2020-02-21 RX ORDER — GABAPENTIN 300 MG/1
CAPSULE ORAL
Qty: 90 CAPSULE | Refills: 2 | Status: SHIPPED | OUTPATIENT
Start: 2020-02-21 | End: 2020-05-28 | Stop reason: SDUPTHER

## 2020-02-21 RX ORDER — DEXTROAMPHETAMINE SACCHARATE, AMPHETAMINE ASPARTATE, DEXTROAMPHETAMINE SULFATE AND AMPHETAMINE SULFATE 7.5; 7.5; 7.5; 7.5 MG/1; MG/1; MG/1; MG/1
TABLET ORAL
Qty: 60 TABLET | Refills: 0 | Status: SHIPPED | OUTPATIENT
Start: 2020-03-03 | End: 2020-05-28 | Stop reason: SDUPTHER

## 2020-02-21 RX ORDER — DEXTROAMPHETAMINE SACCHARATE, AMPHETAMINE ASPARTATE, DEXTROAMPHETAMINE SULFATE AND AMPHETAMINE SULFATE 7.5; 7.5; 7.5; 7.5 MG/1; MG/1; MG/1; MG/1
TABLET ORAL
Qty: 60 TABLET | Refills: 0 | Status: SHIPPED | OUTPATIENT
Start: 2020-04-03 | End: 2020-05-28 | Stop reason: SDUPTHER

## 2020-02-21 RX ORDER — ALPRAZOLAM 1 MG/1
1 TABLET ORAL 3 TIMES DAILY PRN
Qty: 90 TABLET | Refills: 2 | Status: SHIPPED | OUTPATIENT
Start: 2020-02-21 | End: 2020-05-28 | Stop reason: SDUPTHER
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
## 2020-02-21 NOTE — LETTER
[address]  Phone: [phone]  Fax: [phone]    Gunnar Mcneal DO        February 21, 2020     Patient: Vicenta Simpson   YOB: 1980   Date of Visit: 2/21/2020       To Whom It May Concern at [address]: It is my medical opinion that Therese Gross is under my care. I have known Mr. Jimena Gerardo since he established in this clinic December 2014. He has chronic medical conditions requiring prescription medication. Medical conditions include PTSD, chronic generalized anxiety disorder, adult ADD, chronic pain, chronic insomnia. I am aware that Mr. Jimena Gerardo was approved for the [address] for medical marijuana. I am aware that the marijuana use has helped and improved my patient's chronic insomnia and helped reduce the symptoms of PTSD to include the sleep paralysis, and night terrors (to emphasize the important features). In the past Mr. Jimena Gerardo has been prescribed zolpidem (Ambien) by me to address the insomnia which has provided good effectivness. While consuming the medical marijuana, my patient has not needed prescription zolpidem (Ambien). His chronic medical conditions were stable while on the medical marijuana and the current prescriptions. If you have any questions or concerns, please don't hesitate to call.     Sincerely,        Gunnar Mcneal DO

## 2020-05-28 ENCOUNTER — TELEPHONE (OUTPATIENT)
Dept: FAMILY MEDICINE CLINIC | Age: 40
End: 2020-05-28

## 2020-05-28 ENCOUNTER — VIRTUAL VISIT (OUTPATIENT)
Dept: FAMILY MEDICINE CLINIC | Age: 40
End: 2020-05-28
Payer: MEDICARE

## 2020-05-28 VITALS — WEIGHT: 215 LBS | BODY MASS INDEX: 29.16 KG/M2

## 2020-05-28 PROCEDURE — 99214 OFFICE O/P EST MOD 30 MIN: CPT | Performed by: FAMILY MEDICINE

## 2020-05-28 PROCEDURE — G8427 DOCREV CUR MEDS BY ELIG CLIN: HCPCS | Performed by: FAMILY MEDICINE

## 2020-05-28 RX ORDER — ALPRAZOLAM 1 MG/1
1 TABLET ORAL 3 TIMES DAILY PRN
Qty: 90 TABLET | Refills: 2 | Status: SHIPPED | OUTPATIENT
Start: 2020-05-28 | End: 2020-09-11 | Stop reason: SDUPTHER

## 2020-05-28 RX ORDER — GABAPENTIN 300 MG/1
CAPSULE ORAL
Qty: 90 CAPSULE | Refills: 2 | Status: SHIPPED | OUTPATIENT
Start: 2020-05-28 | End: 2020-12-11 | Stop reason: SDUPTHER

## 2020-05-28 RX ORDER — DEXTROAMPHETAMINE SACCHARATE, AMPHETAMINE ASPARTATE, DEXTROAMPHETAMINE SULFATE AND AMPHETAMINE SULFATE 7.5; 7.5; 7.5; 7.5 MG/1; MG/1; MG/1; MG/1
TABLET ORAL
Qty: 60 TABLET | Refills: 0 | Status: SHIPPED | OUTPATIENT
Start: 2020-07-27 | End: 2020-09-11 | Stop reason: SDUPTHER

## 2020-05-28 RX ORDER — DEXTROAMPHETAMINE SACCHARATE, AMPHETAMINE ASPARTATE, DEXTROAMPHETAMINE SULFATE AND AMPHETAMINE SULFATE 7.5; 7.5; 7.5; 7.5 MG/1; MG/1; MG/1; MG/1
TABLET ORAL
Qty: 60 TABLET | Refills: 0 | Status: SHIPPED | OUTPATIENT
Start: 2020-05-28 | End: 2020-09-11 | Stop reason: SDUPTHER

## 2020-05-28 RX ORDER — BUPROPION HYDROCHLORIDE 150 MG/1
150 TABLET ORAL EVERY MORNING
Qty: 30 TABLET | Refills: 3 | Status: SHIPPED | OUTPATIENT
Start: 2020-05-28 | End: 2020-09-11

## 2020-05-28 RX ORDER — DEXTROAMPHETAMINE SACCHARATE, AMPHETAMINE ASPARTATE, DEXTROAMPHETAMINE SULFATE AND AMPHETAMINE SULFATE 7.5; 7.5; 7.5; 7.5 MG/1; MG/1; MG/1; MG/1
TABLET ORAL
Qty: 60 TABLET | Refills: 0 | Status: SHIPPED | OUTPATIENT
Start: 2020-06-27 | End: 2020-09-11 | Stop reason: SDUPTHER

## 2020-05-28 ASSESSMENT — PATIENT HEALTH QUESTIONNAIRE - PHQ9
SUM OF ALL RESPONSES TO PHQ QUESTIONS 1-9: 0
2. FEELING DOWN, DEPRESSED OR HOPELESS: 0
SUM OF ALL RESPONSES TO PHQ9 QUESTIONS 1 & 2: 0
SUM OF ALL RESPONSES TO PHQ QUESTIONS 1-9: 0
1. LITTLE INTEREST OR PLEASURE IN DOING THINGS: 0

## 2020-09-04 ENCOUNTER — TELEPHONE (OUTPATIENT)
Dept: FAMILY MEDICINE CLINIC | Age: 40
End: 2020-09-04

## 2020-09-04 NOTE — TELEPHONE ENCOUNTER
----- Message from Pasadena Luiz sent at 9/4/2020  1:04 PM EDT -----  Subject: Appointment Request    Reason for Call: Routine Physical Exam    QUESTIONS  Type of Appointment? Established Patient  Reason for appointment request? Available appointments did not meet   patient need  Additional Information for Provider? Pt needs appt to refill medications   will run out in 10 days   screened green  ---------------------------------------------------------------------------  --------------  CALL BACK INFO  What is the best way for the office to contact you? OK to leave message on   voicemail  Preferred Call Back Phone Number? [phone]  ---------------------------------------------------------------------------  --------------  SCRIPT ANSWERS  Relationship to Patient? Self  Appointment reason? Well Care/Follow Ups  Select a Well Care/Follow Ups appointment reason? Adult Physical Exam   [Medicare Annual Wellness   AWV   PAP   Pelvic]  (Is the patient requesting to be seen urgently for their symptoms?)? No  (If the patient is female   ask this question) Are you requesting a pap smear with your physical   exam? No  (Patient is Medicare and requesting Annual Wellness Visit)? No  Have you been diagnosed with   tested for   or told that you are suspected of having COVID-19 (Coronavirus)? No  Have you had a fever or taken medication to treat a fever within the past   3 days? No  Have you had a cough   shortness of breath or flu-like symptoms within the past 3 days? No  Do you currently have flu-like symptoms including fever or chills   cough   shortness of breath   or difficulty breathing   or new loss of taste or smell? No  (Service Expert  click yes below to proceed with [employer] As Usual   Scheduling)?  Yes

## 2020-09-11 ENCOUNTER — VIRTUAL VISIT (OUTPATIENT)
Dept: FAMILY MEDICINE CLINIC | Age: 40
End: 2020-09-11
Payer: MEDICARE

## 2020-09-11 PROBLEM — K51.90 ULCERATIVE COLITIS (HCC): Status: RESOLVED | Noted: 2019-06-27 | Resolved: 2020-09-11

## 2020-09-11 PROCEDURE — 99214 OFFICE O/P EST MOD 30 MIN: CPT | Performed by: FAMILY MEDICINE

## 2020-09-11 PROCEDURE — 4004F PT TOBACCO SCREEN RCVD TLK: CPT | Performed by: FAMILY MEDICINE

## 2020-09-11 PROCEDURE — G8427 DOCREV CUR MEDS BY ELIG CLIN: HCPCS | Performed by: FAMILY MEDICINE

## 2020-09-11 PROCEDURE — G8417 CALC BMI ABV UP PARAM F/U: HCPCS | Performed by: FAMILY MEDICINE

## 2020-09-11 RX ORDER — VARENICLINE TARTRATE
KIT
Qty: 1 BOX | Refills: 0 | Status: SHIPPED | OUTPATIENT
Start: 2020-09-11 | End: 2021-02-26 | Stop reason: ALTCHOICE

## 2020-09-11 RX ORDER — DEXTROAMPHETAMINE SACCHARATE, AMPHETAMINE ASPARTATE MONOHYDRATE, DEXTROAMPHETAMINE SULFATE AND AMPHETAMINE SULFATE 7.5; 7.5; 7.5; 7.5 MG/1; MG/1; MG/1; MG/1
30 CAPSULE, EXTENDED RELEASE ORAL EVERY MORNING
Qty: 30 CAPSULE | Refills: 0 | Status: SHIPPED | OUTPATIENT
Start: 2020-09-11 | End: 2020-12-11 | Stop reason: SDUPTHER

## 2020-09-11 RX ORDER — DEXTROAMPHETAMINE SACCHARATE, AMPHETAMINE ASPARTATE MONOHYDRATE, DEXTROAMPHETAMINE SULFATE AND AMPHETAMINE SULFATE 7.5; 7.5; 7.5; 7.5 MG/1; MG/1; MG/1; MG/1
30 CAPSULE, EXTENDED RELEASE ORAL EVERY MORNING
Qty: 30 CAPSULE | Refills: 0 | Status: SHIPPED | OUTPATIENT
Start: 2020-11-10 | End: 2020-12-11 | Stop reason: SDUPTHER

## 2020-09-11 RX ORDER — ALPRAZOLAM 1 MG/1
1 TABLET ORAL 3 TIMES DAILY PRN
Qty: 90 TABLET | Refills: 2 | Status: SHIPPED | OUTPATIENT
Start: 2020-09-11 | End: 2020-12-11 | Stop reason: SDUPTHER

## 2020-09-11 RX ORDER — DEXTROAMPHETAMINE SACCHARATE, AMPHETAMINE ASPARTATE, DEXTROAMPHETAMINE SULFATE AND AMPHETAMINE SULFATE 7.5; 7.5; 7.5; 7.5 MG/1; MG/1; MG/1; MG/1
TABLET ORAL
Qty: 60 TABLET | Refills: 0 | Status: SHIPPED | OUTPATIENT
Start: 2020-11-10 | End: 2020-12-11 | Stop reason: SDUPTHER

## 2020-09-11 RX ORDER — DEXTROAMPHETAMINE SACCHARATE, AMPHETAMINE ASPARTATE MONOHYDRATE, DEXTROAMPHETAMINE SULFATE AND AMPHETAMINE SULFATE 7.5; 7.5; 7.5; 7.5 MG/1; MG/1; MG/1; MG/1
30 CAPSULE, EXTENDED RELEASE ORAL EVERY MORNING
Qty: 30 CAPSULE | Refills: 0 | Status: SHIPPED | OUTPATIENT
Start: 2020-10-11 | End: 2020-12-11 | Stop reason: SDUPTHER

## 2020-09-11 RX ORDER — DEXTROAMPHETAMINE SACCHARATE, AMPHETAMINE ASPARTATE, DEXTROAMPHETAMINE SULFATE AND AMPHETAMINE SULFATE 7.5; 7.5; 7.5; 7.5 MG/1; MG/1; MG/1; MG/1
TABLET ORAL
Qty: 30 TABLET | Refills: 0 | Status: SHIPPED | OUTPATIENT
Start: 2020-10-11 | End: 2020-12-11 | Stop reason: SDUPTHER

## 2020-09-11 RX ORDER — DEXTROAMPHETAMINE SACCHARATE, AMPHETAMINE ASPARTATE, DEXTROAMPHETAMINE SULFATE AND AMPHETAMINE SULFATE 7.5; 7.5; 7.5; 7.5 MG/1; MG/1; MG/1; MG/1
TABLET ORAL
Qty: 30 TABLET | Refills: 0 | Status: SHIPPED | OUTPATIENT
Start: 2020-09-11 | End: 2020-12-11 | Stop reason: SDUPTHER

## 2020-09-11 RX ORDER — VARENICLINE TARTRATE 1 MG/1
1 TABLET, FILM COATED ORAL 2 TIMES DAILY
Qty: 60 TABLET | Refills: 4 | Status: SHIPPED | OUTPATIENT
Start: 2020-09-30 | End: 2022-03-03

## 2020-09-11 NOTE — PROGRESS NOTES
[facility]  TELEMEDICINE Progress Note  Everett Johnson DO      The risks and benefits of converting to a virtual visit were discussed in light of the current infectious disease epidemic. Patient also understood that insurance coverage and co-pays are up to their individual insurance plans. Patient identification was verified at the start of the visit. Misha Cardenas  1980 09/11/20    Patient location: Home address on file  Provider location: Physician's home    Chief Complaint:   Misha Cardenas is a 44 y.o. patient who is here for ADD and anxiety. HPI:   Did not do well with Wellbutrin. With current regimen of the Xanax and he desires strongly to quit smoking. The Wellbutrin did not really help him quit smoking because of the side effects of the Wellbutrin making him feel more irritable and anxious. 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 Denies any palpitations headache or insomnia on the current regimen.     08/08/2020  2   05/28/2020  Gabapentin 300 MG Capsule  90.00  30 Al Bin   [id number]   [facility] ([id number][place]   1   [place]   08/08/2020  2   05/28/2020  Alprazolam 1 MG Tablet  90.00  30 Al Bin   [id number]   Wal ([id number])   2  6.00 LME  [place]   08/08/2020  2   05/28/2020  Dextroamp-Amphetamin 30 MG Tab  60.00  30 Al Bin   [id number]   [facility] ([id number])   [place]   07/05/2020  2   05/28/2020  Alprazolam 1 MG Tablet  90.00  30 Al Bin   [id number]   Wal ([id number])   1  6.00 LME  [place]   07/05/2020  5   05/28/2020  Dextroamp-Amphetamin 30 MG Tab  60.00  30 Al Bin   [id number]   Wal ([id number])   0   Comm Ins   Commercial Insurance coverage [place]   06/04/2020  2   05/28/2020  Dextroamp-Amphetamin 30 MG Tab  60.00  30 Al Bin   [id number]   Wal ([id number])   0   [place]   [place] ROS negative for headache, vision changes, chest pain, shortness of breath, abdominal pain, urinary sx, bowel changes. Past medical, surgical, and social history reviewed. Medications and allergies reviewed. Allergies   Allergen Reactions    Lactose Intolerance (Gi) Nausea Only     Prior to Visit Medications    Medication Sig Taking? Authorizing Provider   amphetamine-dextroamphetamine (ADDERALL) 30 MG tablet Take 1 tab po qAM and 1 tab po mid-day. Yes Rafy Montemayor, DO   amphetamine-dextroamphetamine (ADDERALL) 30 MG tablet Take 1 tab po po mid-day. Yes Rafy Montemayor, DO   amphetamine-dextroamphetamine (ADDERALL, 30MG,) 30 MG tablet Take 1 tab po mid-day. Yes Rafy Montemayor, DO   ALPRAZolam Matthew Stephen) 1 MG tablet Take 1 tablet by mouth 3 times daily as needed for Sleep or Anxiety for up to 90 doses. Yes Rafy Montemayor, DO   varenicline (CHANTIX STARTING MONTH ROMERO) 0.5 MG X 11 & 1 MG X 42 tablet Take by mouth. Yes Rafy Montemayor, DO   amphetamine-dextroamphetamine (ADDERALL XR) 30 MG extended release capsule Take 1 capsule by mouth every morning for 30 days. Yes Rafy Montemayor, DO   amphetamine-dextroamphetamine (ADDERALL XR) 30 MG extended release capsule Take 1 capsule by mouth every morning for 30 doses. Yes Rafy Montemayor, DO   amphetamine-dextroamphetamine (ADDERALL XR) 30 MG extended release capsule Take 1 capsule by mouth every morning for 30 days. Yes Rafy Montemayor, DO   varenicline (CHANTIX CONTINUING MONTH ROMERO) 1 MG tablet Take 1 tablet by mouth 2 times daily Yes Rafy Montemayor, DO   gabapentin (NEURONTIN) 300 MG capsule Take 1 capsule po TID. Yes Rafy Montemayor, DO   gabapentin (NEURONTIN) 300 MG capsule Take 3 capsules by mouth 2 times daily for 30 days.   Rafy Montemayor, DO          Patient-Reported Vitals 9/11/2020   Patient-Reported Weight 210   Patient-Reported Height 6'1\"      There were no vitals filed for this visit. Wt Readings from Last 3 Encounters:   05/28/20 215 lb (97.5 kg)   02/21/20 235 lb 3.2 oz (106.7 kg)   12/02/19 232 lb 12.8 oz (105.6 kg)     BP Readings from Last 3 Encounters:   02/21/20 126/86   12/02/19 129/83   08/21/19 130/86       Patient Active Problem List   Diagnosis    Brachial plexus injury    Anxiety    History of ADHD    Tobacco use    Anxiety disorder    Cervical strain    Strength loss of left hand    Psychophysiological insomnia    Frequent urination    Elevated blood pressure reading    Acute gastritis without hemorrhage    Lipoma of abdominal wall    Chronic back pain       Immunization History   Administered Date(s) Administered    HPV 9-valent Emerson Sers) 02/21/2020    Tdap (Boostrix, Adacel) 07/12/2016, 08/10/2017       Past Medical History:   Diagnosis Date    Anxiety     Chronic back pain 6/27/2019    H/O knee surgery     right    H/O shoulder surgery     left shoulder    Hypertension     IBS (irritable bowel syndrome)     MVP (mitral valve prolapse)     Neuropathy      No past surgical history on file. Family History   Problem Relation Age of Onset    High Blood Pressure Father     Heart Disease Father     Substance Abuse Father     Diabetes Paternal Grandmother      Social History     Socioeconomic History    Marital status: Single     Spouse name: Not on file    Number of children: Not on file    Years of education: Not on file    Highest education level: Not on file   Occupational History    Not on file   Social Needs    Financial resource strain: Not on file    Food insecurity     Worry: Not on file     Inability: Not on file   [employer] needs     Medical: Not on file     Non-medical: Not on file   Tobacco Use    Smoking status: Heavy Tobacco Smoker     Packs/day: 1.00     Years: 22.00     Pack years: 22.00     Types: Cigarettes    Smokeless tobacco: Former User   Substance and Sexual Activity    Alcohol use:  Yes Comment: twice weekly    Drug use: Yes     Types: Opiates      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  Sexual activity: Never   Lifestyle    Physical activity     Days per week: Not on file     Minutes per session: Not on file    Stress: Not on file   Relationships    Social connections     Talks on phone: Not on file     Gets together: Not on file     Attends [employer] service: Not on file     Active member of club or organization: Not on file     Attends meetings of clubs or organizations: Not on file     Relationship status: Not on file    Intimate partner violence     Fear of current or ex partner: Not on file     Emotionally abused: Not on file     Physically abused: Not on file     Forced sexual activity: Not on file   Other Topics Concern    Not on file   Social History Narrative    Not on file       O: There were no vitals taken for this visit. Physical Exam  PHYSICAL EXAMINATION:  [ INSTRUCTIONS:  \"[x]\" Indicates a positive item  \"[]\" Indicates a negative item  -- DELETE ALL ITEMS NOT EXAMINED]  Vital Signs: (As obtained by patient/caregiver or practitioner observation)    Constitutional: [x] Appears well-developed and well-nourished [x] No apparent distress      [] Abnormal-   Mental status  [x] Alert and awake  [x] Oriented to person/place/time [x]Able to follow commands      Eyes:  EOM    [x]  Normal  [] Abnormal-  Sclera  [x]  Normal  [] Abnormal -         Discharge [x]  None visible  [] Abnormal -    HENT:   [x] Normocephalic, atraumatic.   [] Abnormal   [] Mouth/Throat: Mucous membranes are moist.     External Ears [x] Normal  [] Abnormal-     Neck: [x] No visualized mass     Pulmonary/Chest: [x] Respiratory effort normal.  [x] No visualized signs of difficulty breathing or respiratory distress        [] Abnormal-      Musculoskeletal:   [] Normal gait with no signs of ataxia         [x] Normal range of motion of neck        [] Abnormal-       Neurological:        [x] No Facial Asymmetry (Cranial nerve 7 motor function) (limited exam to video visit)          [x] No gaze palsy        [] Abnormal-         Skin:        [x] No significant exanthematous lesions or discoloration noted on facial skin         [] Abnormal-            Psychiatric:       [x] Normal Affect [] No Hallucinations        [] Abnormal-     Other pertinent observable physical exam findings-nonapplicable    Due to this being a TeleHealth encounter, evaluation of the following organ systems is limited: Vitals/Constitutional/EENT/Resp/CV/GI//MS/Neuro/Skin/Heme-Lymph-Imm. ASSESSMENT   Diagnosis Orders   1. Adult attention deficit disorder  amphetamine-dextroamphetamine (ADDERALL) 30 MG tablet    amphetamine-dextroamphetamine (ADDERALL) 30 MG tablet    amphetamine-dextroamphetamine (ADDERALL, 30MG,) 30 MG tablet    amphetamine-dextroamphetamine (ADDERALL XR) 30 MG extended release capsule    amphetamine-dextroamphetamine (ADDERALL XR) 30 MG extended release capsule    amphetamine-dextroamphetamine (ADDERALL XR) 30 MG extended release capsule   2. Anxiety  ALPRAZolam (XANAX) 1 MG tablet   3. Tobacco use  varenicline (CHANTIX STARTING MONTH ROMERO) 0.5 MG X 11 & 1 MG X 42 tablet    varenicline (CHANTIX CONTINUING MONTH ROMERO) 1 MG tablet       With a change in job and needing more focus will make the change to the extended release 30 mg in the morning and immediate release 30 mg midday. He will get back to us if he has side effects of palpitations or other increase of #2. #2: The current medical regimen is effective;  continue present plan and medications. #3: Encouraged cessation. Discussed with patient treatment options, and programs to help pt quit. Pt verbalizes understanding, aware of risks of persistent tobacco usage. Spent time reviewing successful tips to make Chantix work. PLAN          If applicable, see additional patient information and instructions under \"Patient Instructions. \"    Return in about 3 months (around 2020) for ADD, Smoking cessation. Patient Instructions   Have her/him look online chantix. Pix[employer] for different methods of quitting. Most people choose gradual quit method: smoke as usual.  She/he will find that she will smoke less and might even be off cigarettes by the end of the fist month. It is important to fill the continuation pack and to take for at least a total of 3 months even if she/he has quit smoking on month #1. Common side effects are upset stomach and vivid dreams. If side effects are becoming excessive, temporarily back down to daily instead of BID, then eventually get back to twice a day. --Take with food and full glass of liquid such as water. --If causes nausea, take half a tablet each time until the nausea improves. --even though most people quit on month 1, to permanently quit, I adivse that patient complete month 1 starter pack then fill the continuation pack to take for additional 2 to 5 months. TOTAL TIME (in minutes) SPENT ON CONFERENCIN    Please note a portion of this chart was generated using dragon dictation software. Although every effort was made to ensure the accuracy of this automated transcription, some errors in transcription may have occurred. Pursuant to the emergency declaration under the [facility], 1135 waiver authority and the "[employer] and [employer] Act, this Virtual  Visit was conducted, with patient's consent, to reduce the patient's risk of exposure to COVID-19 and provide continuity of care for an established patient. Services were provided through a video synchronous discussion virtually to substitute for in-person clinic visit. Patient was instructed that the AVS is available on My Chart or was emailed to the patient if not on My Chart. Lab orders were emailed to patient if they do not use a [facility] lab.  Any work notes were sent to patient through My Chart or email.

## 2020-10-12 ENCOUNTER — OFFICE VISIT (OUTPATIENT)
Dept: PRIMARY CARE CLINIC | Age: 40
End: 2020-10-12
Payer: MEDICARE

## 2020-10-12 PROCEDURE — 99211 OFF/OP EST MAY X REQ PHY/QHP: CPT | Performed by: NURSE PRACTITIONER

## 2020-10-12 PROCEDURE — G8428 CUR MEDS NOT DOCUMENT: HCPCS | Performed by: NURSE PRACTITIONER

## 2020-10-12 PROCEDURE — G8417 CALC BMI ABV UP PARAM F/U: HCPCS | Performed by: NURSE PRACTITIONER

## 2020-10-12 NOTE — PROGRESS NOTES
Claudean Berry received a viral test for COVID-19. They were educated on isolation and quarantine as appropriate. For any symptoms, they were directed to seek care from their PCP, given contact information to establish with a doctor, directed to an urgent care or the emergency room.

## 2020-10-13 LAB — SARS-COV-2, NAA: DETECTED

## 2020-10-14 ENCOUNTER — TELEPHONE (OUTPATIENT)
Dept: FAMILY MEDICINE CLINIC | Age: 40
End: 2020-10-14

## 2020-10-14 RX ORDER — HYDROCODONE BITARTRATE AND ACETAMINOPHEN 5; 325 MG/1; MG/1
1 TABLET ORAL EVERY 6 HOURS PRN
Qty: 12 TABLET | Refills: 0 | Status: SHIPPED | OUTPATIENT
Start: 2020-10-14 | End: 2020-10-17

## 2020-10-15 NOTE — TELEPHONE ENCOUNTER
L/m for pt w/ info
Patient has tested positive for COVID. He states he is feeling at his worse today. Fever has been 100.5 - 101.5  Tylenol will only take it down to 99.5 and then he is right back up. Patient reports he is aching all over and can't sleep. Denies SOB  Positive for headache  Positive for diarrhea, no vomiting    Patient reports the mid part of his back is hurting, mainly while lying on his back    Patient is drinking a lot of fluid, though he feels he is not urinating as much as he is taking in. Patient's father, Lynn Franco was to have a procedure today, but had to cancel d/t son's diagnosis of COVID. Father is asymptomatic. Patient would like to know if he can be treated to feel more comfortable.      Please advise
Please review covid test results
[facility]. Since now shortness of breath, hold off from CXR. Is there stronger pain relief I can help Valentine Sender? I can choose Tylenol#3, Tramadol or Vicodin for 3 to 5 days. His preference?
hard copy, drawn during this pregnancy

## 2020-10-22 ENCOUNTER — OFFICE VISIT (OUTPATIENT)
Dept: PRIMARY CARE CLINIC | Age: 40
End: 2020-10-22
Payer: MEDICARE

## 2020-10-22 PROCEDURE — G8417 CALC BMI ABV UP PARAM F/U: HCPCS | Performed by: NURSE PRACTITIONER

## 2020-10-22 PROCEDURE — 99211 OFF/OP EST MAY X REQ PHY/QHP: CPT | Performed by: NURSE PRACTITIONER

## 2020-10-22 PROCEDURE — G8428 CUR MEDS NOT DOCUMENT: HCPCS | Performed by: NURSE PRACTITIONER

## 2020-10-22 NOTE — PROGRESS NOTES
Amanda Schilling received a viral test for COVID-19. They were educated on isolation and quarantine as appropriate. For any symptoms, they were directed to seek care from their PCP, given contact information to establish with a doctor, directed to an urgent care or the emergency room.

## 2020-10-24 LAB — SARS-COV-2, NAA: DETECTED

## 2020-10-26 ENCOUNTER — TELEPHONE (OUTPATIENT)
Dept: FAMILY MEDICINE CLINIC | Age: 40
End: 2020-10-26

## 2020-10-26 NOTE — RESULT ENCOUNTER NOTE
If you have completed these 3 things, you are able to return to normal activies. Isolate until all three of these things are true: 1) your symptoms are better, 2) it has been 10 days since you first felt sick, and 3) you have had no fever for at least 24 hours without using medicine that lowers fever. If you did not have symptoms:   Stay home for 10 days after the date you were tested. If you develop symptoms during those 10 days, stay home until all three of these things are true: 1) your symptoms are better, 2) it has been 10 days since you first felt sick, and 3) you have had no fever for at least 24 hours without using medicine that lowers fever.       Detected results can happen for months and you are not considered contagious. A retest is not required.

## 2020-10-26 NOTE — TELEPHONE ENCOUNTER
Patient calling today regarding his positive COVID test.      Patient states he went to be retested to have clearance to go back to work and his retest was also positive. Patient was advised to continue to stay in quarantine for 10 more days after he retested on 10/22. He states he needs to show a negative test for work. He was advised not to retest for another 11+ days after 10/22. Patient was also advised that if he has an increase with symptoms with fever, diarrhea with dehydration or SOB to seek help from the ED    Patient voiced understanding.

## 2020-11-04 ENCOUNTER — TELEPHONE (OUTPATIENT)
Dept: FAMILY MEDICINE CLINIC | Age: 40
End: 2020-11-04

## 2020-11-04 NOTE — TELEPHONE ENCOUNTER
Pt called stating he spoke with someone in the office a couple weeks ago regarding needing documentation with his social security number on it to show at the [facility] to get his license renewed and that it would be at  for . Nothing was found up front and no documentation about the previous call was made. [facility] was called to see if ok for our office to provide this for pt's need. [facility] states it would not be accepted and that he will need either W2, 1099, or SS card. Pt informed.  Voiced understanding

## 2020-12-11 ENCOUNTER — VIRTUAL VISIT (OUTPATIENT)
Dept: FAMILY MEDICINE CLINIC | Age: 40
End: 2020-12-11
Payer: MEDICARE

## 2020-12-11 PROCEDURE — G8427 DOCREV CUR MEDS BY ELIG CLIN: HCPCS | Performed by: FAMILY MEDICINE

## 2020-12-11 PROCEDURE — 99214 OFFICE O/P EST MOD 30 MIN: CPT | Performed by: FAMILY MEDICINE

## 2020-12-11 PROCEDURE — G8484 FLU IMMUNIZE NO ADMIN: HCPCS | Performed by: FAMILY MEDICINE

## 2020-12-11 PROCEDURE — G8417 CALC BMI ABV UP PARAM F/U: HCPCS | Performed by: FAMILY MEDICINE

## 2020-12-11 PROCEDURE — 4004F PT TOBACCO SCREEN RCVD TLK: CPT | Performed by: FAMILY MEDICINE

## 2020-12-11 RX ORDER — DEXTROAMPHETAMINE SACCHARATE, AMPHETAMINE ASPARTATE MONOHYDRATE, DEXTROAMPHETAMINE SULFATE AND AMPHETAMINE SULFATE 7.5; 7.5; 7.5; 7.5 MG/1; MG/1; MG/1; MG/1
30 CAPSULE, EXTENDED RELEASE ORAL EVERY MORNING
Qty: 30 CAPSULE | Refills: 0 | Status: SHIPPED | OUTPATIENT
Start: 2020-12-11 | End: 2021-03-12 | Stop reason: SDUPTHER

## 2020-12-11 RX ORDER — DEXTROAMPHETAMINE SACCHARATE, AMPHETAMINE ASPARTATE, DEXTROAMPHETAMINE SULFATE AND AMPHETAMINE SULFATE 7.5; 7.5; 7.5; 7.5 MG/1; MG/1; MG/1; MG/1
TABLET ORAL
Qty: 60 TABLET | Refills: 0 | Status: SHIPPED | OUTPATIENT
Start: 2021-01-10 | End: 2021-03-12 | Stop reason: SDUPTHER

## 2020-12-11 RX ORDER — SILDENAFIL 50 MG/1
50 TABLET, FILM COATED ORAL PRN
Qty: 10 TABLET | Refills: 3 | Status: SHIPPED | OUTPATIENT
Start: 2020-12-11 | End: 2021-03-12 | Stop reason: SDUPTHER

## 2020-12-11 RX ORDER — GABAPENTIN 300 MG/1
CAPSULE ORAL
Qty: 90 CAPSULE | Refills: 5 | Status: SHIPPED | OUTPATIENT
Start: 2020-12-11 | End: 2021-06-11 | Stop reason: SDUPTHER

## 2020-12-11 RX ORDER — ALPRAZOLAM 1 MG/1
1 TABLET ORAL 3 TIMES DAILY PRN
Qty: 90 TABLET | Refills: 2 | Status: SHIPPED | OUTPATIENT
Start: 2020-12-11 | End: 2021-02-25 | Stop reason: SDUPTHER

## 2020-12-11 RX ORDER — DEXTROAMPHETAMINE SACCHARATE, AMPHETAMINE ASPARTATE MONOHYDRATE, DEXTROAMPHETAMINE SULFATE AND AMPHETAMINE SULFATE 7.5; 7.5; 7.5; 7.5 MG/1; MG/1; MG/1; MG/1
30 CAPSULE, EXTENDED RELEASE ORAL EVERY MORNING
Qty: 30 CAPSULE | Refills: 0 | Status: SHIPPED | OUTPATIENT
Start: 2021-02-09 | End: 2021-03-12 | Stop reason: SDUPTHER

## 2020-12-11 RX ORDER — ZOLPIDEM TARTRATE 5 MG/1
5 TABLET ORAL NIGHTLY PRN
Qty: 30 TABLET | Refills: 0 | Status: SHIPPED | OUTPATIENT
Start: 2020-12-11 | End: 2021-01-10

## 2020-12-11 RX ORDER — DEXTROAMPHETAMINE SACCHARATE, AMPHETAMINE ASPARTATE, DEXTROAMPHETAMINE SULFATE AND AMPHETAMINE SULFATE 7.5; 7.5; 7.5; 7.5 MG/1; MG/1; MG/1; MG/1
TABLET ORAL
Qty: 30 TABLET | Refills: 0 | Status: SHIPPED | OUTPATIENT
Start: 2021-02-09 | End: 2021-03-12 | Stop reason: SDUPTHER

## 2020-12-11 RX ORDER — DEXTROAMPHETAMINE SACCHARATE, AMPHETAMINE ASPARTATE, DEXTROAMPHETAMINE SULFATE AND AMPHETAMINE SULFATE 7.5; 7.5; 7.5; 7.5 MG/1; MG/1; MG/1; MG/1
TABLET ORAL
Qty: 30 TABLET | Refills: 0 | Status: SHIPPED | OUTPATIENT
Start: 2020-12-11 | End: 2021-03-12 | Stop reason: SDUPTHER

## 2020-12-11 RX ORDER — DEXTROAMPHETAMINE SACCHARATE, AMPHETAMINE ASPARTATE MONOHYDRATE, DEXTROAMPHETAMINE SULFATE AND AMPHETAMINE SULFATE 7.5; 7.5; 7.5; 7.5 MG/1; MG/1; MG/1; MG/1
30 CAPSULE, EXTENDED RELEASE ORAL EVERY MORNING
Qty: 30 CAPSULE | Refills: 0 | Status: SHIPPED | OUTPATIENT
Start: 2021-01-10 | End: 2021-03-12 | Stop reason: SDUPTHER

## 2020-12-11 NOTE — PROGRESS NOTES
[facility]  TELEMEDICINE Progress Note  Dona Kayser,       The risks and benefits of converting to a virtual visit were discussed in light of the current infectious disease epidemic. Patient also understood that insurance coverage and co-pays are up to their individual insurance plans. Patient identification was verified at the start of the visit. Amanda Schilling  1980 12/11/20    Patient location: Home address on file  Provider location: Physician's home    Chief Complaint:   Amanda Schilling is a 36 y.o. patient who is here for f/u        HPI:   Doing well with ADD medication. Denies palpitations, headaches or insomnia. Denies increased anxiety while on medication. Does not endorse hallucinations, delusional thinking, aggression, hostility or any manic episodes. Xanax is helpful. ROS negative for headache, vision changes, chest pain, shortness of breath, abdominal pain, urinary sx, bowel changes. Past medical, surgical, and social history reviewed. Medications and allergies reviewed. Allergies   Allergen Reactions    Lactose Intolerance (Gi) Nausea Only     Prior to Visit Medications    Medication Sig Taking? Authorizing Provider   amphetamine-dextroamphetamine (ADDERALL) 30 MG tablet Take 1 tab po po mid-day. Yes Elise Bailey Binrajni, DO   amphetamine-dextroamphetamine (ADDERALL) 30 MG tablet Take 1 tab po qAM and 1 tab po mid-day. Yes Elise Bailey Bingcang, DO   amphetamine-dextroamphetamine (ADDERALL, 30MG,) 30 MG tablet Take 1 tab po mid-day. Yes Elise Daquan Bingcarodrigo, DO   amphetamine-dextroamphetamine (ADDERALL XR) 30 MG extended release capsule Take 1 capsule by mouth every morning for 30 days. Yes Elise Bailey Bingcang, DO   amphetamine-dextroamphetamine (ADDERALL XR) 30 MG extended release capsule Take 1 capsule by mouth every morning for 30 doses.  Yes Elise Bailey Bingcang, DO   amphetamine-dextroamphetamine (ADDERALL XR) 30 MG extended release capsule Take 1 capsule by mouth every morning for 30 days. Yes Hardik Montemayor, DO   gabapentin (NEURONTIN) 300 MG capsule Take 1 capsule po TID. Yes Hardik Montemayor, DO   ALPRAZolam Danney Germain) 1 MG tablet Take 1 tablet by mouth 3 times daily as needed for Sleep or Anxiety for up to 90 doses. Yes Hardik Montemayor, DO   zolpidem (AMBIEN) 5 MG tablet Take 1 tablet by mouth nightly as needed for Sleep for up to 30 days. Yes Hardik Montemayor, DO   sildenafil (VIAGRA) 50 MG tablet Take 1 tablet by mouth as needed for Erectile Dysfunction Yes Hardik Montemayor, DO   varenicline (CHANTIX STARTING MONTH PAK) 0.5 MG X 11 & 1 MG X 42 tablet Take by mouth. Patient not taking: Reported on 12/11/2020  Hardik Montemayor DO   varenicline (CHANTIX CONTINUING MONTH PAK) 1 MG tablet Take 1 tablet by mouth 2 times daily  Jack Safe, DO   gabapentin (NEURONTIN) 300 MG capsule Take 3 capsules by mouth 2 times daily for 30 days. Hardik Montemayor DO          Patient-Reported Vitals 12/11/2020   Patient-Reported Weight 207   Patient-Reported Height 6'1\"      There were no vitals filed for this visit.    Wt Readings from Last 3 Encounters:   05/28/20 215 lb (97.5 kg)   02/21/20 235 lb 3.2 oz (106.7 kg)   12/02/19 232 lb 12.8 oz (105.6 kg)     BP Readings from Last 3 Encounters:   02/21/20 126/86   12/02/19 129/83   08/21/19 130/86       Patient Active Problem List   Diagnosis    Brachial plexus injury    Anxiety    History of ADHD    Tobacco use    Anxiety disorder    Cervical strain    Strength loss of left hand    Psychophysiological insomnia    Frequent urination    Elevated blood pressure reading    Acute gastritis without hemorrhage    Lipoma of abdominal wall    Chronic back pain       Immunization History   Administered Date(s) Administered    HPV 9-valent Zoe Wilson) 02/21/2020    Tdap (Boostrix, Adacel) 07/12/2016, 08/10/2017       Past Medical History: Diagnosis Date    Anxiety     Chronic back pain 6/27/2019    H/O knee surgery     right    H/O shoulder surgery     left shoulder    Hypertension     IBS (irritable bowel syndrome)     MVP (mitral valve prolapse)     Neuropathy      No past surgical history on file. Family History   Problem Relation Age of Onset    High Blood Pressure Father     Heart Disease Father     Substance Abuse Father     Diabetes Paternal Grandmother      Social History     Socioeconomic History    Marital status: Single     Spouse name: Not on file    Number of children: Not on file    Years of education: Not on file    Highest education level: Not on file   Occupational History    Not on file   Social Needs    Financial resource strain: Not on file    Food insecurity     Worry: Not on file     Inability: Not on file   [employer] needs     Medical: Not on file     Non-medical: Not on file   Tobacco Use    Smoking status: Heavy Tobacco Smoker     Packs/day: 1.00     Years: 22.00     Pack years: 22.00     Types: Cigarettes    Smokeless tobacco: Former User   Substance and Sexual Activity    Alcohol use: Yes     Comment: twice weekly    Drug use: Yes     Types: Opiates      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     Sexual activity: Never   Lifestyle    Physical activity     Days per week: Not on file     Minutes per session: Not on file    Stress: Not on file   Relationships    Social connections     Talks on phone: Not on file     Gets together: Not on file     Attends Sikh service: Not on file     Active member of club or organization: Not on file     Attends meetings of clubs or organizations: Not on file     Relationship status: Not on file    Intimate partner violence     Fear of current or ex partner: Not on file     Emotionally abused: Not on file     Physically abused: Not on file     Forced sexual activity: Not on file   Other Topics Concern    Not on file   Social History Narrative    Not on file       O: There were no vitals taken for this visit. Physical Exam  PHYSICAL EXAMINATION:  [ INSTRUCTIONS:  \"[x]\" Indicates a positive item  \"[]\" Indicates a negative item  -- DELETE ALL ITEMS NOT EXAMINED]  Vital Signs: (As obtained by patient/caregiver or practitioner observation)    Constitutional: [x] Appears well-developed and well-nourished [x] No apparent distress      [] Abnormal-   Mental status  [x] Alert and awake  [x] Oriented to person/place/time [x]Able to follow commands      Eyes:  EOM    [x]  Normal  [] Abnormal-  Sclera  [x]  Normal  [] Abnormal -         Discharge [x]  None visible  [] Abnormal -    HENT:   [x] Normocephalic, atraumatic. [] Abnormal   [] Mouth/Throat: Mucous membranes are moist.     External Ears [x] Normal  [] Abnormal-     Neck: [x] No visualized mass     Pulmonary/Chest: [x] Respiratory effort normal.  [x] No visualized signs of difficulty breathing or respiratory distress        [] Abnormal-      Musculoskeletal:   [] Normal gait with no signs of ataxia         [x] Normal range of motion of neck        [] Abnormal-       Neurological:        [x] No Facial Asymmetry (Cranial nerve 7 motor function) (limited exam to video visit)          [x] No gaze palsy        [] Abnormal-         Skin:        [x] No significant exanthematous lesions or discoloration noted on facial skin         [] Abnormal-            Psychiatric:       [x] Normal Affect [] No Hallucinations        [] Abnormal-     Other pertinent observable physical exam findings- n/a    Due to this being a TeleHealth encounter, evaluation of the following organ systems is limited: Vitals/Constitutional/EENT/Resp/CV/GI//MS/Neuro/Skin/Heme-Lymph-Imm. ASSESSMENT   Diagnosis Orders   1. Insomnia, unspecified type  zolpidem (AMBIEN) 5 MG tablet   2.  Adult attention deficit disorder  amphetamine-dextroamphetamine (ADDERALL) 30 MG tablet    amphetamine-dextroamphetamine (ADDERALL) 30 MG tablet amphetamine-dextroamphetamine (ADDERALL, 30MG,) 30 MG tablet    amphetamine-dextroamphetamine (ADDERALL XR) 30 MG extended release capsule    amphetamine-dextroamphetamine (ADDERALL XR) 30 MG extended release capsule    amphetamine-dextroamphetamine (ADDERALL XR) 30 MG extended release capsule   3. Chronic low back pain with sciatica, sciatica laterality unspecified, unspecified back pain laterality  gabapentin (NEURONTIN) 300 MG capsule   4. Anxiety  ALPRAZolam (XANAX) 1 MG tablet   5. Erectile dysfunction, unspecified erectile dysfunction type  sildenafil (VIAGRA) 50 MG tablet         PLAN          If applicable, see additional patient information and instructions under \"Patient Instructions. \"    Return in about 13 weeks (around 3/12/2021). There are no Patient Instructions on file for this visit. TOTAL TIME (in minutes) SPENT ON CONFERENCIN    Please note a portion of this chart was generated using dragon dictation software. Although every effort was made to ensure the accuracy of this automated transcription, some errors in transcription may have occurred. Pursuant to the emergency declaration under the [facility], [facility] waiver authority and the [employer] Act, this Virtual  Visit was conducted, with patient's consent, to reduce the patient's risk of exposure to COVID-19 and provide continuity of care for an established patient. Services were provided through a video synchronous discussion virtually to substitute for in-person clinic visit. Patient was instructed that the AVS is available on My Chart or was emailed to the patient if not on My Chart. Lab orders were emailed to patient if they do not use a [facility] lab. Any work notes were sent to patient through My Chart or email.

## 2020-12-14 ENCOUNTER — TELEPHONE (OUTPATIENT)
Dept: ADMINISTRATIVE | Age: 40
End: 2020-12-14

## 2021-01-27 ENCOUNTER — VIRTUAL VISIT (OUTPATIENT)
Dept: FAMILY MEDICINE CLINIC | Age: 41
End: 2021-01-27
Payer: MEDICARE

## 2021-01-27 ENCOUNTER — TELEPHONE (OUTPATIENT)
Dept: FAMILY MEDICINE CLINIC | Age: 41
End: 2021-01-27

## 2021-01-27 DIAGNOSIS — H10.33 ACUTE BACTERIAL CONJUNCTIVITIS OF BOTH EYES: Primary | ICD-10-CM

## 2021-01-27 PROCEDURE — G8427 DOCREV CUR MEDS BY ELIG CLIN: HCPCS | Performed by: FAMILY MEDICINE

## 2021-01-27 PROCEDURE — 99213 OFFICE O/P EST LOW 20 MIN: CPT | Performed by: FAMILY MEDICINE

## 2021-01-27 RX ORDER — MOXIFLOXACIN 5 MG/ML
1 SOLUTION/ DROPS OPHTHALMIC 3 TIMES DAILY
Qty: 1 BOTTLE | Refills: 0 | Status: SHIPPED | OUTPATIENT
Start: 2021-01-27 | End: 2021-02-03

## 2021-01-27 SDOH — ECONOMIC STABILITY: TRANSPORTATION INSECURITY
IN THE PAST 12 MONTHS, HAS LACK OF TRANSPORTATION KEPT YOU FROM MEETINGS, WORK, OR FROM GETTING THINGS NEEDED FOR DAILY LIVING?: YES

## 2021-01-27 ASSESSMENT — PATIENT HEALTH QUESTIONNAIRE - PHQ9
SUM OF ALL RESPONSES TO PHQ9 QUESTIONS 1 & 2: 0
1. LITTLE INTEREST OR PLEASURE IN DOING THINGS: 0
SUM OF ALL RESPONSES TO PHQ QUESTIONS 1-9: 0

## 2021-01-27 NOTE — TELEPHONE ENCOUNTER
Spoke with pt re: assistant information. Pt requested information be mailed to him at his fathers address. [address], [address]

## 2021-01-27 NOTE — PROGRESS NOTES
2021    TELEHEALTH EVALUATION -- Audio/Visual (During JVORR-92 public health emergency)    HPI:    Rosmery Zavala (:  1980) has requested an audio/video evaluation for the following concern(s):    Pink eye    Eulalia Miller complains of eye irritation all day on . Had contact in; took contact out and di not improve. Right > left. Red, swollen, eyes matted shut in the am.  No visual change, eye pain, photophobia. Eyes feel a bit scratchy but no foreign body in the eye. No fever. Rx;  Covering eye to keep his hands off it help. Review of Systems    Outpatient Medications Marked as Taking for the 21 encounter (Virtual Visit) with Rebel Lino MD   Medication Sig Dispense Refill    [START ON 2021] amphetamine-dextroamphetamine (ADDERALL) 30 MG tablet Take 1 tab po po mid-day. 30 tablet 0    amphetamine-dextroamphetamine (ADDERALL) 30 MG tablet Take 1 tab po qAM and 1 tab po mid-day. 60 tablet 0    amphetamine-dextroamphetamine (ADDERALL, 30MG,) 30 MG tablet Take 1 tab po mid-day. 30 tablet 0    [START ON 2021] amphetamine-dextroamphetamine (ADDERALL XR) 30 MG extended release capsule Take 1 capsule by mouth every morning for 30 days. 30 capsule 0    amphetamine-dextroamphetamine (ADDERALL XR) 30 MG extended release capsule Take 1 capsule by mouth every morning for 30 doses. 30 capsule 0    amphetamine-dextroamphetamine (ADDERALL XR) 30 MG extended release capsule Take 1 capsule by mouth every morning for 30 days. 30 capsule 0    gabapentin (NEURONTIN) 300 MG capsule Take 1 capsule po TID. 90 capsule 5    sildenafil (VIAGRA) 50 MG tablet Take 1 tablet by mouth as needed for Erectile Dysfunction 10 tablet 3    gabapentin (NEURONTIN) 300 MG capsule Take 3 capsules by mouth 2 times daily for 30 days.  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          Patient Active Problem List   Diagnosis    Brachial plexus injury    Anxiety    History of ADHD    Tobacco use    Anxiety disorder    Cervical strain    Strength loss of left hand    Psychophysiological insomnia    Frequent urination    Elevated blood pressure reading    Acute gastritis without hemorrhage    Lipoma of abdominal wall    Chronic back pain      Allergies   Allergen Reactions    Lactose Intolerance (Gi) Nausea Only   ,   Past Medical History:   Diagnosis Date    Anxiety     Chronic back pain 6/27/2019    H/O knee surgery     right    H/O shoulder surgery     left shoulder    Hypertension     IBS (irritable bowel syndrome)     MVP (mitral valve prolapse)     Neuropathy        PHYSICAL EXAMINATION:  [ INSTRUCTIONS:  \"[x]\" Indicates a positive item  \"[]\" Indicates a negative item  -- DELETE ALL ITEMS NOT EXAMINED]  Vital Signs: (As obtained by patient/caregiver or practitioner observation)    Blood pressure-  Heart rate-    Respiratory rate-    Temperature-  98.1Pulse oximetry-   Wt 210 lb  Wt Readings from Last 3 Encounters:   05/28/20 215 lb (97.5 kg)   02/21/20 235 lb 3.2 oz (106.7 kg)   12/02/19 232 lb 12.8 oz (105.6 kg)     Temp Readings from Last 3 Encounters:   02/21/20 97.5 °F (36.4 °C) (Oral)   08/21/19 97.5 °F (36.4 °C) (Oral)   06/27/19 98.2 °F (36.8 °C) (Oral)     BP Readings from Last 3 Encounters:   02/21/20 126/86   12/02/19 129/83   08/21/19 130/86     Pulse Readings from Last 3 Encounters:   02/21/20 109   12/02/19 91   08/21/19 109      Constitutional: [x] Appears well-developed and well-nourished [] No apparent distress      [] Abnormal-   Mental status  [x] Alert and awake  [x] Oriented to person/place/time     Eyes:  EOM    [x]  Normal  [] Abnormal- Sclera  []  Normal  [x] Abnormal -         Discharge [x]  None visible  [] Abnormal -    Bilateral conjunctival injection. Lids noromal      Neck: [x] No visualized mass     Pulmonary/Chest: [x] Respiratory effort normal.  [x] No visualized signs of difficulty breathing or respiratory distress        [] Abnormal-           Skin:        [x] No significant exanthematous lesions or discoloration noted on facial skin         [] Abnormal-            Psychiatric:       [x] Normal Affect [] No Hallucinations        [] Abnormal-     Other pertinent observable physical exam findings-     ASSESSMENT/PLAN:  1. Acute bacterial conjunctivitis of both eyes  Advised to throw away contacts. Leave contacts out until tx complete. Follow up if not improved in 2 to 3 days or prn worsening  - moxifloxacin (VIGAMOX) 0.5 % ophthalmic solution; Place 1 drop into both eyes 3 times daily for 7 days  Dispense: 1 Bottle; Refill: 0  Side effects of current medications reviewed and questions answered. Call or return to clinic prn if these symptoms worsen or fail to improve as anticipated. No follow-ups on file. Rosanne Urena is a 36 y.o. male being evaluated by a Virtual Visit (video visit) encounter to address concerns as mentioned above. A caregiver was present when appropriate. Due to this being a TeleHealth encounter ([facility]), evaluation of the following organ systems was limited: Vitals/Constitutional/EENT/Resp/CV/GI//MS/Neuro/Skin/Heme-Lymph-Imm. Pursuant to the emergency declaration under the [address] [place] [address] and the [employer] and [employer] Act, this Virtual Visit was conducted with patient's (and/or legal guardian's) consent, to reduce the patient's risk of exposure to COVID-19 and provide necessary medical care. The patient (and/or legal guardian) has also been advised to contact this office for worsening conditions or problems, and seek emergency medical treatment and/or call 911 if deemed necessary. Patient identification was verified at the start of the visit: Yes    Total time spent on this encounter: Not billed by time    Services were provided through a video synchronous discussion virtually to substitute for in-person clinic visit. Patient and provider were located at their individual homes. --Angelica Barton MD on 1/26/2021 at 9:18 PM    An electronic signature was used to authenticate this note.

## 2021-02-03 ENCOUNTER — TELEPHONE (OUTPATIENT)
Dept: FAMILY MEDICINE CLINIC | Age: 41
End: 2021-02-03

## 2021-02-03 NOTE — TELEPHONE ENCOUNTER
Pt returned call. Pt was asked if he received the resources information Lucretia mailed. Pt states he just received mail from our office but has not opened it yet. Pt states he is living with his dad currently in [place] due to being out of a job since Nov. 2020  States he is not receiving any benefits of any kind and is not receiving any income. Pt states his dad is providing most things but does not want to always take from him. Pt does not have transportation. He does not have a license and usually takes a bus or uses uber. Pt states it is hard to get his medication at times. Pt was informed to look over the resources provided and call them to see if any of them are able to provide assistance. I told him I or someone will follow up in a few days to see if they were of help to him and to see if additional resources are needed.

## 2021-02-03 NOTE — TELEPHONE ENCOUNTER
Tried calling pt to follow up on social determinants. Left a message to return call and ask for [employer].   Will follow up again as well

## 2021-02-10 NOTE — TELEPHONE ENCOUNTER
Left message on recorder for patient to call back at  [phone] and speak to [facility] or [employer]

## 2021-02-18 NOTE — TELEPHONE ENCOUNTER
Spoke to pt. And he has not been able to check out the resources we mailed him yet. States he is in between living arrangements right now and has not had time to deal with this. I informed pt. I would give him some time and would f/u in a month.

## 2021-02-25 DIAGNOSIS — F41.9 ANXIETY: ICD-10-CM

## 2021-02-25 RX ORDER — ALPRAZOLAM 1 MG/1
1 TABLET ORAL 3 TIMES DAILY PRN
Qty: 90 TABLET | Refills: 0 | Status: SHIPPED | OUTPATIENT
Start: 2021-02-25 | End: 2021-03-04

## 2021-02-25 NOTE — TELEPHONE ENCOUNTER
----- Message from [facility] sent at 2/25/2021 10:51 AM EST -----  Subject: Refill Request    QUESTIONS  Name of Medication? ALPRAZolam (XANAX) 1 MG tablet  Patient-reported dosage and instructions? 3x daily  How many days do you have left? 4  Preferred [facility]? [id number]  Pharmacy phone number (if available)? [phone]  Additional Information for Provider? Pt needs this refilled he has an appt   on march 12th but he will be out of the medication by then. He's confused   on why his refills are out. Call pt with more information.   ---------------------------------------------------------------------------  --------------  CALL BACK INFO  What is the best way for the office to contact you? OK to leave message on   voicemail  Preferred Call Back Phone Number?  [phone]

## 2021-02-26 RX ORDER — ALPRAZOLAM 1 MG/1
1 TABLET ORAL 3 TIMES DAILY PRN
Qty: 90 TABLET | Refills: 0 | Status: SHIPPED | OUTPATIENT
Start: 2021-02-26 | End: 2021-03-12 | Stop reason: SDUPTHER

## 2021-03-12 ENCOUNTER — TELEPHONE (OUTPATIENT)
Dept: ORTHOPEDIC SURGERY | Age: 41
End: 2021-03-12

## 2021-03-12 ENCOUNTER — VIRTUAL VISIT (OUTPATIENT)
Dept: FAMILY MEDICINE CLINIC | Age: 41
End: 2021-03-12
Payer: MEDICARE

## 2021-03-12 DIAGNOSIS — F41.9 ANXIETY: ICD-10-CM

## 2021-03-12 DIAGNOSIS — N52.9 ERECTILE DYSFUNCTION, UNSPECIFIED ERECTILE DYSFUNCTION TYPE: ICD-10-CM

## 2021-03-12 DIAGNOSIS — F98.8 ADULT ATTENTION DEFICIT DISORDER: ICD-10-CM

## 2021-03-12 PROCEDURE — G8427 DOCREV CUR MEDS BY ELIG CLIN: HCPCS | Performed by: FAMILY MEDICINE

## 2021-03-12 PROCEDURE — 99213 OFFICE O/P EST LOW 20 MIN: CPT | Performed by: FAMILY MEDICINE

## 2021-03-12 RX ORDER — DEXTROAMPHETAMINE SACCHARATE, AMPHETAMINE ASPARTATE MONOHYDRATE, DEXTROAMPHETAMINE SULFATE AND AMPHETAMINE SULFATE 7.5; 7.5; 7.5; 7.5 MG/1; MG/1; MG/1; MG/1
30 CAPSULE, EXTENDED RELEASE ORAL EVERY MORNING
Qty: 30 CAPSULE | Refills: 0 | Status: SHIPPED | OUTPATIENT
Start: 2021-03-12 | End: 2021-06-11 | Stop reason: SDUPTHER

## 2021-03-12 RX ORDER — DEXTROAMPHETAMINE SACCHARATE, AMPHETAMINE ASPARTATE, DEXTROAMPHETAMINE SULFATE AND AMPHETAMINE SULFATE 7.5; 7.5; 7.5; 7.5 MG/1; MG/1; MG/1; MG/1
30 TABLET ORAL 2 TIMES DAILY
Qty: 60 TABLET | Refills: 0 | Status: SHIPPED | OUTPATIENT
Start: 2021-03-12 | End: 2021-06-11 | Stop reason: SDUPTHER

## 2021-03-12 RX ORDER — DEXTROAMPHETAMINE SACCHARATE, AMPHETAMINE ASPARTATE, DEXTROAMPHETAMINE SULFATE AND AMPHETAMINE SULFATE 7.5; 7.5; 7.5; 7.5 MG/1; MG/1; MG/1; MG/1
30 TABLET ORAL 2 TIMES DAILY
Qty: 60 TABLET | Refills: 0 | Status: SHIPPED | OUTPATIENT
Start: 2021-05-11 | End: 2021-05-27 | Stop reason: SDUPTHER

## 2021-03-12 RX ORDER — DEXTROAMPHETAMINE SACCHARATE, AMPHETAMINE ASPARTATE, DEXTROAMPHETAMINE SULFATE AND AMPHETAMINE SULFATE 7.5; 7.5; 7.5; 7.5 MG/1; MG/1; MG/1; MG/1
TABLET ORAL
Qty: 60 TABLET | Refills: 0 | Status: SHIPPED | OUTPATIENT
Start: 2021-04-11 | End: 2021-04-19 | Stop reason: ALTCHOICE

## 2021-03-12 RX ORDER — ALPRAZOLAM 1 MG/1
1 TABLET ORAL 3 TIMES DAILY PRN
Qty: 90 TABLET | Refills: 2 | Status: SHIPPED | OUTPATIENT
Start: 2021-03-12 | End: 2021-06-11 | Stop reason: SDUPTHER

## 2021-03-12 RX ORDER — DEXTROAMPHETAMINE SACCHARATE, AMPHETAMINE ASPARTATE MONOHYDRATE, DEXTROAMPHETAMINE SULFATE AND AMPHETAMINE SULFATE 7.5; 7.5; 7.5; 7.5 MG/1; MG/1; MG/1; MG/1
30 CAPSULE, EXTENDED RELEASE ORAL EVERY MORNING
Qty: 30 CAPSULE | Refills: 0 | Status: SHIPPED | OUTPATIENT
Start: 2021-04-11 | End: 2021-04-19 | Stop reason: ALTCHOICE

## 2021-03-12 RX ORDER — DEXTROAMPHETAMINE SACCHARATE, AMPHETAMINE ASPARTATE MONOHYDRATE, DEXTROAMPHETAMINE SULFATE AND AMPHETAMINE SULFATE 7.5; 7.5; 7.5; 7.5 MG/1; MG/1; MG/1; MG/1
30 CAPSULE, EXTENDED RELEASE ORAL EVERY MORNING
Qty: 30 CAPSULE | Refills: 0 | Status: SHIPPED | OUTPATIENT
Start: 2021-05-11 | End: 2021-06-11 | Stop reason: SDUPTHER

## 2021-03-12 RX ORDER — SILDENAFIL 50 MG/1
50 TABLET, FILM COATED ORAL PRN
Qty: 10 TABLET | Refills: 3 | Status: SHIPPED | OUTPATIENT
Start: 2021-03-12 | End: 2021-06-11 | Stop reason: SDUPTHER

## 2021-03-12 RX ORDER — SILDENAFIL 50 MG/1
50 TABLET, FILM COATED ORAL PRN
Qty: 10 TABLET | Refills: 3 | Status: SHIPPED | OUTPATIENT
Start: 2021-03-12 | End: 2021-03-12 | Stop reason: SDUPTHER

## 2021-03-12 NOTE — PROGRESS NOTES
[facility]  TELEMEDICINE Progress Note  Flaco Santoyo, DO      The risks and benefits of converting to a virtual visit were discussed in light of the current infectious disease epidemic. Patient also understood that insurance coverage and co-pays are up to their individual insurance plans. Patient identification was verified at the start of the visit. Mariana Galvan  1980 03/12/21    Patient location: Home address on file  Provider location: Physician's home    Chief Complaint:   Mariana Galvan is a 36 y.o. patient who is here for add        HPI:   Doing well with ADD medication. Denies palpitations, headaches or insomnia. Denies increased anxiety while on medication. Does not endorse hallucinations, delusional thinking, aggression, hostility or any manic episodes. Does not take nitrates. Sildenafil is helpful. Takes Xanax. ROS negative for headache, vision changes, chest pain, shortness of breath, abdominal pain, urinary sx, bowel changes. Past medical, surgical, and social history reviewed. Medications and allergies reviewed. Allergies   Allergen Reactions    Lactose Intolerance (Gi) Nausea Only     Prior to Visit Medications    Medication Sig Taking? Authorizing Provider   amphetamine-dextroamphetamine (ADDERALL) 30 MG tablet Take 1 tablet by mouth 2 times daily for 60 doses. Take 1 tab po po mid-day. Yes Antonio Montemayor, DO   amphetamine-dextroamphetamine (ADDERALL) 30 MG tablet Take 1 tab po qAM and 1 tab po mid-day. Yes Antonio Montemayor, DO   amphetamine-dextroamphetamine (ADDERALL, 30MG,) 30 MG tablet Take 1 tablet by mouth 2 times daily for 30 days. Take 1 tab po mid-day. Yes Antonio Montemayor, DO   amphetamine-dextroamphetamine (ADDERALL XR) 30 MG extended release capsule Take 1 capsule by mouth every morning for 30 days.  Yes Antonio Montemayor, DO   amphetamine-dextroamphetamine (ADDERALL XR) 30 MG extended release capsule Take 1 capsule by mouth every morning for 30 doses. Yes Christiana Montemayor, DO   amphetamine-dextroamphetamine (ADDERALL XR) 30 MG extended release capsule Take 1 capsule by mouth every morning for 30 days. Yes Christiana Montemayor DO   sildenafil (VIAGRA) 50 MG tablet Take 1 tablet by mouth as needed for Erectile Dysfunction Yes Christiana Montemayor DO   gabapentin (NEURONTIN) 300 MG capsule Take 1 capsule po TID. Yes Christiana Montemayor DO   varenicline (CHANTIX CONTINUING MONTH ROMERO) 1 MG tablet Take 1 tablet by mouth 2 times daily  Christiana Montemayor DO   gabapentin (NEURONTIN) 300 MG capsule Take 3 capsules by mouth 2 times daily for 30 days. Christiana Montemayor DO          Patient-Reported Vitals 3/12/2021   Patient-Reported Weight 212   Patient-Reported Height 6'1\"   Patient-Reported Systolic 656   Patient-Reported Diastolic 89   Patient-Reported Temperature 98.2      There were no vitals filed for this visit.    Wt Readings from Last 3 Encounters:   05/28/20 215 lb (97.5 kg)   02/21/20 235 lb 3.2 oz (106.7 kg)   12/02/19 232 lb 12.8 oz (105.6 kg)     BP Readings from Last 3 Encounters:   02/21/20 126/86   12/02/19 129/83   08/21/19 130/86       Patient Active Problem List   Diagnosis    Brachial plexus injury    Anxiety    History of ADHD    Tobacco use    Anxiety disorder    Cervical strain    Strength loss of left hand    Psychophysiological insomnia    Frequent urination    Elevated blood pressure reading    Acute gastritis without hemorrhage    Lipoma of abdominal wall    Chronic back pain       Immunization History   Administered Date(s) Administered    HPV 9-valent Eloy Pro) 02/21/2020    Tdap (Boostrix, Adacel) 07/12/2016, 08/10/2017       Past Medical History:   Diagnosis Date    Anxiety     Chronic back pain 6/27/2019    H/O knee surgery     right    H/O shoulder surgery     left shoulder    Hypertension     IBS (irritable bowel syndrome)     MVP (mitral valve prolapse)     Neuropathy      No past surgical history on file. Family History   Problem Relation Age of Onset    High Blood Pressure Father     Heart Disease Father     Substance Abuse Father     Diabetes Paternal Grandmother      Social History     Socioeconomic History    Marital status: Single     Spouse name: Not on file    Number of children: Not on file    Years of education: Not on file    Highest education level: Not on file   Occupational History    Not on file   Social Needs    Financial resource strain: Very hard    Food insecurity     Worry: Often true     Inability: Often true    Transportation needs     Medical: Yes     Non-medical: Yes   Tobacco Use    Smoking status: Heavy Tobacco Smoker     Packs/day: 1.00     Years: 22.00     Pack years: 22.00     Types: Cigarettes    Smokeless tobacco: Former User   Substance and Sexual Activity    Alcohol use: Yes     Comment: twice weekly    Drug use: Yes     Types: Opiates      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  Sexual activity: Never   Lifestyle    Physical activity     Days per week: Not on file     Minutes per session: Not on file    Stress: Not on file   Relationships    Social connections     Talks on phone: Not on file     Gets together: Not on file     Attends Sabianism service: Not on file     Active member of club or organization: Not on file     Attends meetings of clubs or organizations: Not on file     Relationship status: Not on file    Intimate partner violence     Fear of current or ex partner: Not on file     Emotionally abused: Not on file     Physically abused: Not on file     Forced sexual activity: Not on file   Other Topics Concern    Not on file   Social History Narrative    Not on file       O: There were no vitals taken for this visit.   Physical Exam  PHYSICAL EXAMINATION:  [ INSTRUCTIONS:  \"[x]\" Indicates a positive item  \"[]\" Indicates a negative item  -- DELETE ALL ITEMS NOT EXAMINED]  Vital Erectile dysfunction, unspecified erectile dysfunction type  sildenafil (VIAGRA) 50 MG tablet    DISCONTINUED: sildenafil (VIAGRA) 50 MG tablet       #1: The current medical regimen is effective;  continue present plan and medications. Pt aware of need for every 3 month medication followup appointments, and that medication refills for benzodiazepines, narcotics and/or stimulants will only be given at appointment. The risks, benefits, potential side effects and barriers to medication use were addressed today. Understanding was acknowledged. Patient asked to follow-up if condition(s) do not improve as anticipated. #2:   The risks, benefits, potential side effects and barriers to medication use were addressed today. Understanding was acknowledged. Patient asked to follow-up if condition(s) do not improve as anticipated. PLAN          Time spent on encounter (to include any same day medical record review): 20 minutes  Established E/M: 10-19 (33975), 20-29 (77866), 30-39 (69701), 40-54 (26541)   New E/M: 15-29 (35486), 30-44 (21335), 45-59 (41489), 60-74 (80362)  Telephone E/M: 5-10 (84075), 11-20 (94678), 21-30 (20127)    If applicable, see additional patient information and instructions under \"Patient Instructions. \"    No follow-ups on file. There are no Patient Instructions on file for this visit. Please note a portion of this chart was generated using dragon dictation software. Although every effort was made to ensure the accuracy of this automated transcription, some errors in transcription may have occurred. Pursuant to the emergency declaration under the [facility], 1135 waiver authority and the [employer] Act, this Virtual  Visit was conducted, with patient's consent, to reduce the patient's risk of exposure to COVID-19 and provide continuity of care for an established patient.     Services were provided through a video synchronous discussion virtually to substitute for in-person clinic visit. Patient was instructed that the AVS is available on My Chart or was emailed to the patient if not on My Chart. Lab orders were emailed to patient if they do not use a [facility] lab. Any work notes were sent to patient through My Chart or email.

## 2021-04-14 ENCOUNTER — TELEPHONE (OUTPATIENT)
Dept: FAMILY MEDICINE CLINIC | Age: 41
End: 2021-04-14

## 2021-04-19 ENCOUNTER — VIRTUAL VISIT (OUTPATIENT)
Dept: FAMILY MEDICINE CLINIC | Age: 41
End: 2021-04-19
Payer: MEDICARE

## 2021-04-19 DIAGNOSIS — Z02.9 ADMINISTRATIVE ENCOUNTER: ICD-10-CM

## 2021-04-19 DIAGNOSIS — M62.830 BACK SPASM: Primary | ICD-10-CM

## 2021-04-19 PROCEDURE — 4004F PT TOBACCO SCREEN RCVD TLK: CPT | Performed by: FAMILY MEDICINE

## 2021-04-19 PROCEDURE — 99213 OFFICE O/P EST LOW 20 MIN: CPT | Performed by: FAMILY MEDICINE

## 2021-04-19 PROCEDURE — G8427 DOCREV CUR MEDS BY ELIG CLIN: HCPCS | Performed by: FAMILY MEDICINE

## 2021-04-19 PROCEDURE — G8417 CALC BMI ABV UP PARAM F/U: HCPCS | Performed by: FAMILY MEDICINE

## 2021-04-19 RX ORDER — METHOCARBAMOL 750 MG/1
750 TABLET, FILM COATED ORAL 3 TIMES DAILY PRN
Qty: 30 TABLET | Refills: 0 | Status: SHIPPED | OUTPATIENT
Start: 2021-04-19 | End: 2021-05-19

## 2021-04-19 RX ORDER — METHOCARBAMOL 500 MG/1
500 TABLET, FILM COATED ORAL 3 TIMES DAILY PRN
Qty: 30 TABLET | Refills: 0 | Status: SHIPPED | OUTPATIENT
Start: 2021-04-19 | End: 2021-04-19

## 2021-04-19 NOTE — PROGRESS NOTES
[facility]  TELEMEDICINE Progress Note  Jonathan Balderrama,       The risks and benefits of converting to a virtual visit were discussed in light of the current infectious disease epidemic. Patient also understood that insurance coverage and co-pays are up to their individual insurance plans. Patient identification was verified at the start of the visit. Yulia Franco  1980 04/19/21    Patient location: Home address on file  Provider location: Physician's home    Chief Complaint:   Yulia Franco is a 36 y.o. patient who is here for letter for [facility] of motor vehicles to drive again and also upper back pain        HPI:   Today we went over the p.m. 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 My patient underwent inpatient and outpatient program for alcohol and other substances. Patient did ask his  to complete the form which is permitted by the  no longer is doing that job. Additionally experiences right upper back pain at times. 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 Trying over-the-counter such as ibuprofen. ROS negative for headache, vision changes, chest pain, shortness of breath, abdominal pain, urinary sx, bowel changes. Past medical, surgical, and social history reviewed. Medications and allergies reviewed. Allergies   Allergen Reactions    Lactose Intolerance (Gi) Nausea Only     Prior to Visit Medications    Medication Sig Taking? Authorizing Provider   methocarbamol (ROBAXIN-750) 750 MG tablet Take 1 tablet by mouth 3 times daily as needed (back spasm) Yes Connie Montemayor,    amphetamine-dextroamphetamine (ADDERALL) 30 MG tablet Take 1 tablet by mouth 2 times daily for 60 doses. Take 1 tab po po mid-day.  Yes Connie Montemayor,    amphetamine-dextroamphetamine (ADDERALL XR) 30 MG extended release capsule Take 1 capsule by mouth every morning for 30 days. Yes Jose Montemayor DO   sildenafil (VIAGRA) 50 MG tablet Take 1 tablet by mouth as needed for Erectile Dysfunction Yes Jose Montemayor DO   gabapentin (NEURONTIN) 300 MG capsule Take 1 capsule po TID. Yes Jose Montemayor, DO   amphetamine-dextroamphetamine (ADDERALL, 30MG,) 30 MG tablet Take 1 tablet by mouth 2 times daily for 30 days. Take 1 tab po mid-day. Jose Montemayor DO   amphetamine-dextroamphetamine (ADDERALL XR) 30 MG extended release capsule Take 1 capsule by mouth every morning for 30 days. Jose Montemayor DO   varenicline (CHANTIX CONTINUING MONTH ROMERO) 1 MG tablet Take 1 tablet by mouth 2 times daily  Jose Montemayor DO   gabapentin (NEURONTIN) 300 MG capsule Take 3 capsules by mouth 2 times daily for 30 days. Jose Montemayor DO          Patient-Reported Vitals 3/12/2021   Patient-Reported Weight 212   Patient-Reported Height 6'1\"   Patient-Reported Systolic 033   Patient-Reported Diastolic 89   Patient-Reported Temperature 98.2      There were no vitals filed for this visit.    Wt Readings from Last 3 Encounters:   05/28/20 215 lb (97.5 kg)   02/21/20 235 lb 3.2 oz (106.7 kg)   12/02/19 232 lb 12.8 oz (105.6 kg)     BP Readings from Last 3 Encounters:   02/21/20 126/86   12/02/19 129/83   08/21/19 130/86       Patient Active Problem List   Diagnosis    Brachial plexus injury    Anxiety    History of ADHD    Tobacco use    Anxiety disorder    Cervical strain    Strength loss of left hand    Psychophysiological insomnia    Frequent urination    Elevated blood pressure reading    Acute gastritis without hemorrhage    Lipoma of abdominal wall    Chronic back pain       Immunization History   Administered Date(s) Administered    HPV 9-valent Gunnar Jamee) 02/21/2020    Tdap (Boostrix, Adacel) 07/12/2016, 08/10/2017       Past Medical History:   Diagnosis Date    Anxiety     Chronic back pain 6/27/2019    H/O knee surgery right    H/O shoulder surgery     left shoulder    Hypertension     IBS (irritable bowel syndrome)     MVP (mitral valve prolapse)     Neuropathy      No past surgical history on file. Family History   Problem Relation Age of Onset    High Blood Pressure Father     Heart Disease Father     Substance Abuse Father     Diabetes Paternal Grandmother      Social History     Socioeconomic History    Marital status: Single     Spouse name: Not on file    Number of children: Not on file    Years of education: Not on file    Highest education level: Not on file   Occupational History    Not on file   Social Needs    Financial resource strain: Very hard    Food insecurity     Worry: Often true     Inability: Often true    Transportation needs     Medical: Yes     Non-medical: Yes   Tobacco Use    Smoking status: Heavy Tobacco Smoker     Packs/day: 1.00     Years: 22.00     Pack years: 22.00     Types: Cigarettes    Smokeless tobacco: Former User   Substance and Sexual Activity    Alcohol use: Yes     Comment: twice weekly    Drug use: Yes     Types: Opiates      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  Sexual activity: Never   Lifestyle    Physical activity     Days per week: Not on file     Minutes per session: Not on file    Stress: Not on file   Relationships    Social connections     Talks on phone: Not on file     Gets together: Not on file     Attends Moravian service: Not on file     Active member of club or organization: Not on file     Attends meetings of clubs or organizations: Not on file     Relationship status: Not on file    Intimate partner violence     Fear of current or ex partner: Not on file     Emotionally abused: Not on file     Physically abused: Not on file     Forced sexual activity: Not on file   Other Topics Concern    Not on file   Social History Narrative    Not on file       O: There were no vitals taken for this visit.   Physical Exam  PHYSICAL EXAMINATION:  [ INSTRUCTIONS:  \"[x]\" Indicates a positive item  \"[]\" Indicates a negative item  -- DELETE ALL ITEMS NOT EXAMINED]  Vital Signs: (As obtained by patient/caregiver or practitioner observation)    Constitutional: [x] Appears well-developed and well-nourished [x] No apparent distress      [] Abnormal-   Mental status  [x] Alert and awake  [x] Oriented to person/place/time [x]Able to follow commands      Eyes:  EOM    [x]  Normal  [] Abnormal-  Sclera  [x]  Normal  [] Abnormal -         Discharge [x]  None visible  [] Abnormal -    HENT:   [x] Normocephalic, atraumatic. [] Abnormal   [] Mouth/Throat: Mucous membranes are moist.     External Ears [x] Normal  [] Abnormal-     Neck: [x] No visualized mass     Pulmonary/Chest: [x] Respiratory effort normal.  [x] No visualized signs of difficulty breathing or respiratory distress        [] Abnormal-      Musculoskeletal:   [] Normal gait with no signs of ataxia         [x] Normal range of motion of neck        [] Abnormal-       Neurological:        [x] No Facial Asymmetry (Cranial nerve 7 motor function) (limited exam to video visit)          [x] No gaze palsy        [] Abnormal-         Skin:        [x] No significant exanthematous lesions or discoloration noted on facial skin         [] Abnormal-            Psychiatric:       [x] Normal Affect [] No Hallucinations        [] Abnormal-denies any SI/HI    Other pertinent observable physical exam findings-noncontributory    Due to this being a TeleHealth encounter, evaluation of the following organ systems is limited: Vitals/Constitutional/EENT/Resp/CV/GI//MS/Neuro/Skin/Heme-Lymph-Imm. ASSESSMENT   Diagnosis Orders   1. Back spasm  DISCONTINUED: methocarbamol (ROBAXIN) 500 MG tablet   2. Administrative encounter       #1: The risks, benefits, potential side effects and barriers to medication use were addressed today. Understanding was acknowledged.   Patient asked to follow-up if condition(s) do not improve as anticipated. #2:  Form completed. Scanned to chart. At patient's request will be mailed to his father's address in [facility]. He can call us if it has not been received by the end of this week. PLAN          Time spent on encounter (to include any same day medical record review): 22 minutes  Established E/M: 10-19 (69353), 20-29 (85677), 30-39 (57253), 40-54 (93277)   New E/M: 15-29 (84543), 30-44 (44001), 45-59 (27724), 60-74 (84756)  Telephone E/M: 5-10 (46452), 11-20 (69870), 21-30 (05696)    If applicable, see additional patient information and instructions under \"Patient Instructions. \"    Return in about 8 weeks (around 6/11/2021). There are no Patient Instructions on file for this visit. Please note a portion of this chart was generated using dragon dictation software. Although every effort was made to ensure the accuracy of this automated transcription, some errors in transcription may have occurred. Pursuant to the emergency declaration under the [facility], 1135 waiver authority and the [employer] Act, this Virtual  Visit was conducted, with patient's consent, to reduce the patient's risk of exposure to COVID-19 and provide continuity of care for an established patient. Services were provided through a video synchronous discussion virtually to substitute for in-person clinic visit. Patient was instructed that the AVS is available on My Chart or was emailed to the patient if not on My Chart. Lab orders were emailed to patient if they do not use a [facility] lab. Any work notes were sent to patient through My Chart or email.

## 2021-05-27 DIAGNOSIS — F98.8 ADULT ATTENTION DEFICIT DISORDER: ICD-10-CM

## 2021-05-27 RX ORDER — DEXTROAMPHETAMINE SACCHARATE, AMPHETAMINE ASPARTATE, DEXTROAMPHETAMINE SULFATE AND AMPHETAMINE SULFATE 7.5; 7.5; 7.5; 7.5 MG/1; MG/1; MG/1; MG/1
30 TABLET ORAL 2 TIMES DAILY
Qty: 60 TABLET | Refills: 0 | Status: SHIPPED | OUTPATIENT
Start: 2021-05-27 | End: 2021-06-11 | Stop reason: SDUPTHER

## 2021-06-11 ENCOUNTER — VIRTUAL VISIT (OUTPATIENT)
Dept: FAMILY MEDICINE CLINIC | Age: 41
End: 2021-06-11
Payer: MEDICARE

## 2021-06-11 DIAGNOSIS — G89.29 CHRONIC LOW BACK PAIN WITH SCIATICA, SCIATICA LATERALITY UNSPECIFIED, UNSPECIFIED BACK PAIN LATERALITY: ICD-10-CM

## 2021-06-11 DIAGNOSIS — F41.9 ANXIETY: Primary | ICD-10-CM

## 2021-06-11 DIAGNOSIS — N52.9 ERECTILE DYSFUNCTION, UNSPECIFIED ERECTILE DYSFUNCTION TYPE: ICD-10-CM

## 2021-06-11 DIAGNOSIS — S39.012A LOW BACK STRAIN, INITIAL ENCOUNTER: ICD-10-CM

## 2021-06-11 DIAGNOSIS — M54.40 CHRONIC LOW BACK PAIN WITH SCIATICA, SCIATICA LATERALITY UNSPECIFIED, UNSPECIFIED BACK PAIN LATERALITY: ICD-10-CM

## 2021-06-11 DIAGNOSIS — F98.8 ADULT ATTENTION DEFICIT DISORDER: ICD-10-CM

## 2021-06-11 PROCEDURE — G8427 DOCREV CUR MEDS BY ELIG CLIN: HCPCS | Performed by: FAMILY MEDICINE

## 2021-06-11 PROCEDURE — 99214 OFFICE O/P EST MOD 30 MIN: CPT | Performed by: FAMILY MEDICINE

## 2021-06-11 RX ORDER — DEXTROAMPHETAMINE SACCHARATE, AMPHETAMINE ASPARTATE, DEXTROAMPHETAMINE SULFATE AND AMPHETAMINE SULFATE 7.5; 7.5; 7.5; 7.5 MG/1; MG/1; MG/1; MG/1
30 TABLET ORAL 2 TIMES DAILY
Qty: 60 TABLET | Refills: 0 | Status: SHIPPED | OUTPATIENT
Start: 2021-06-12 | End: 2021-09-10 | Stop reason: SDUPTHER

## 2021-06-11 RX ORDER — GABAPENTIN 300 MG/1
CAPSULE ORAL
Qty: 90 CAPSULE | Refills: 5 | Status: SHIPPED | OUTPATIENT
Start: 2021-06-11 | End: 2021-12-17

## 2021-06-11 RX ORDER — ALPRAZOLAM 1 MG/1
1 TABLET ORAL 3 TIMES DAILY PRN
Qty: 90 TABLET | Refills: 2 | Status: SHIPPED | OUTPATIENT
Start: 2021-06-11 | End: 2021-06-18

## 2021-06-11 RX ORDER — DEXTROAMPHETAMINE SACCHARATE, AMPHETAMINE ASPARTATE MONOHYDRATE, DEXTROAMPHETAMINE SULFATE AND AMPHETAMINE SULFATE 7.5; 7.5; 7.5; 7.5 MG/1; MG/1; MG/1; MG/1
30 CAPSULE, EXTENDED RELEASE ORAL EVERY MORNING
Qty: 30 CAPSULE | Refills: 0 | Status: SHIPPED | OUTPATIENT
Start: 2021-08-01 | End: 2021-09-10 | Stop reason: SDUPTHER

## 2021-06-11 RX ORDER — TIZANIDINE 4 MG/1
4 TABLET ORAL 3 TIMES DAILY PRN
Qty: 30 TABLET | Refills: 0 | Status: SHIPPED | OUTPATIENT
Start: 2021-06-11

## 2021-06-11 RX ORDER — DEXTROAMPHETAMINE SACCHARATE, AMPHETAMINE ASPARTATE, DEXTROAMPHETAMINE SULFATE AND AMPHETAMINE SULFATE 7.5; 7.5; 7.5; 7.5 MG/1; MG/1; MG/1; MG/1
30 TABLET ORAL 2 TIMES DAILY
Qty: 60 TABLET | Refills: 0 | Status: SHIPPED | OUTPATIENT
Start: 2021-07-12 | End: 2021-09-10 | Stop reason: SDUPTHER

## 2021-06-11 RX ORDER — DEXTROAMPHETAMINE SACCHARATE, AMPHETAMINE ASPARTATE, DEXTROAMPHETAMINE SULFATE AND AMPHETAMINE SULFATE 7.5; 7.5; 7.5; 7.5 MG/1; MG/1; MG/1; MG/1
30 TABLET ORAL 2 TIMES DAILY
Qty: 60 TABLET | Refills: 0 | Status: SHIPPED | OUTPATIENT
Start: 2021-08-11 | End: 2021-09-10 | Stop reason: SDUPTHER

## 2021-06-11 RX ORDER — SILDENAFIL 50 MG/1
50 TABLET, FILM COATED ORAL PRN
Qty: 10 TABLET | Refills: 3 | Status: SHIPPED | OUTPATIENT
Start: 2021-06-11 | End: 2021-09-10 | Stop reason: SDUPTHER

## 2021-06-11 RX ORDER — DEXTROAMPHETAMINE SACCHARATE, AMPHETAMINE ASPARTATE MONOHYDRATE, DEXTROAMPHETAMINE SULFATE AND AMPHETAMINE SULFATE 7.5; 7.5; 7.5; 7.5 MG/1; MG/1; MG/1; MG/1
30 CAPSULE, EXTENDED RELEASE ORAL EVERY MORNING
Qty: 30 CAPSULE | Refills: 0 | Status: SHIPPED | OUTPATIENT
Start: 2021-07-02 | End: 2021-09-10 | Stop reason: SDUPTHER

## 2021-06-11 NOTE — PROGRESS NOTES
[facility]  TELEMEDICINE Progress Note  Kayli Barnhart DO      The risks and benefits of converting to a virtual visit were discussed in light of the current infectious disease epidemic. Patient also understood that insurance coverage and co-pays are up to their individual insurance plans. Patient identification was verified at the start of the visit. Mayra Ramos  1980 06/11/21    Patient location: Home address on file  Provider location: Physician's home    Chief Complaint:   Mayra Ramos is a 36 y.o. patient who is here for ADD and lower back pain        HPI:     Doing well with ADD medication. Denies palpitations, headaches or insomnia. Denies increased anxiety while on medication. Does not endorse hallucinations, delusional thinking, aggression, hostility or any manic episodes. Experiencing lower back pain and no specific strain that he can think of. Interested to try massage therapy or physical therapy. No alarm features of loss of bowel or bladder control. Denies any saddle anesthesia. Has tried a muscle relaxer in the past including Robaxin without any benefit. Alprazolam and sildenafil are helpful    ROS negative for headache, vision changes, chest pain, shortness of breath, abdominal pain, urinary sx, bowel changes. Past medical, surgical, and social history reviewed. Medications and allergies reviewed. Allergies   Allergen Reactions    Lactose Intolerance (Gi) Nausea Only     Prior to Visit Medications    Medication Sig Taking? Authorizing Provider   ALPRAZolam Oletha Willis) 1 MG tablet Take 1 tablet by mouth 3 times daily as needed for Sleep or Anxiety for up to 90 doses. Yes Lorayne Nicole Montemayor, DO   amphetamine-dextroamphetamine (ADDERALL XR) 30 MG extended release capsule Take 1 capsule by mouth every morning for 30 days.  Yes Lorayne Lente Albina, DO   amphetamine-dextroamphetamine (ADDERALL XR) 30 MG extended release capsule Take 1 capsule by mouth every morning for 30 days. Yes Jose R Montemayor DO   amphetamine-dextroamphetamine (ADDERALL) 30 MG tablet Take 1 tablet by mouth 2 times daily for 60 doses. Take 1 tab po po mid-day. Yes Jose R Montemayor DO   amphetamine-dextroamphetamine (ADDERALL, 30MG,) 30 MG tablet Take 1 tablet by mouth 2 times daily for 30 days. Take 1 tab po mid-day. Yes Jose R Montemayor DO   gabapentin (NEURONTIN) 300 MG capsule Take 1 capsule po TID. Yes Jose R Montemayor DO   sildenafil (VIAGRA) 50 MG tablet Take 1 tablet by mouth as needed for Erectile Dysfunction Yes Jose R Montemayor DO   tiZANidine (ZANAFLEX) 4 MG tablet Take 1 tablet by mouth 3 times daily as needed (lower back pain) Yes Jose R Montemayor DO   amphetamine-dextroamphetamine (ADDERALL) 30 MG tablet Take 1 tablet by mouth 2 times daily for 60 doses. Take 1 tab po po mid-day. Yes Jose R Montemayor DO   varenicline (CHANTIX CONTINUING MONTH ROMERO) 1 MG tablet Take 1 tablet by mouth 2 times daily  Jose R Montemayor DO   gabapentin (NEURONTIN) 300 MG capsule Take 3 capsules by mouth 2 times daily for 30 days. Jose R Montemayor DO          Patient-Reported Vitals 6/11/2021   Patient-Reported Weight 205   Patient-Reported Height -   Patient-Reported Systolic 201   Patient-Reported Diastolic 726   Patient-Reported Temperature 97.9      There were no vitals filed for this visit.    Wt Readings from Last 3 Encounters:   05/28/20 215 lb (97.5 kg)   02/21/20 235 lb 3.2 oz (106.7 kg)   12/02/19 232 lb 12.8 oz (105.6 kg)     BP Readings from Last 3 Encounters:   02/21/20 126/86   12/02/19 129/83   08/21/19 130/86       Patient Active Problem List   Diagnosis    Brachial plexus injury    Anxiety    History of ADHD    Tobacco use    Anxiety disorder    Cervical strain    Strength loss of left hand    Psychophysiological insomnia    Frequent urination    Elevated blood pressure reading    Acute gastritis without hemorrhage    Lipoma of abdominal wall    Chronic back pain       Immunization History   Administered Date(s) Administered    COVID-19, Pfizer, PF, 30mcg/0.3mL 04/24/2021, 05/26/2021    HPV 9-valent Conor Mcadams) 02/21/2020    Tdap (Boostrix, Adacel) 07/12/2016, 08/10/2017       Past Medical History:   Diagnosis Date    Anxiety     Chronic back pain 6/27/2019    H/O knee surgery     right    H/O shoulder surgery     left shoulder    Hypertension     IBS (irritable bowel syndrome)     MVP (mitral valve prolapse)     Neuropathy      No past surgical history on file. Family History   Problem Relation Age of Onset    High Blood Pressure Father     Heart Disease Father     Substance Abuse Father     Diabetes Paternal Grandmother      Social History     Socioeconomic History    Marital status: Single     Spouse name: Not on file    Number of children: Not on file    Years of education: Not on file    Highest education level: Not on file   Occupational History    Not on file   Tobacco Use    Smoking status: Heavy Tobacco Smoker     Packs/day: 1.00     Years: 22.00     Pack years: 22.00     Types: Cigarettes    Smokeless tobacco: Former User   Substance and Sexual Activity    Alcohol use: Yes     Comment: twice weekly    Drug use: Yes     Types: Opiates      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  Sexual activity: Never   Other Topics Concern    Not on file   Social History Narrative    Not on file     Social Determinants of Health     Financial Resource Strain: High Risk    Difficulty of Paying Living Expenses: Very hard   Food Insecurity: Food Insecurity Present    Worried About Running Out of Food in the Last Year: Often true    Ramsey of Food in the Last Year: Often true   Transportation Needs: Unmet Transportation Needs    Lack of Transportation (Medical):  Yes    Lack of Transportation (Non-Medical): Yes   Physical Activity:     Days of Exercise per Week:     Minutes of Exercise 30-39 (52811), 40-54 (84220)   New E/M: 15-29 (21220), 30-44 (89709), 45-59 (93856), 60-74 (06482)  Telephone E/M: 5-10 (77546), 11-20 (23579), 21-30 (66354)    If applicable, see additional patient information and instructions under \"Patient Instructions. \"    Return in about 13 weeks (around 9/10/2021). Patient Instructions   Calll or send [employer] message with BP reading. Please note a portion of this chart was generated using dragon dictation software. Although every effort was made to ensure the accuracy of this automated transcription, some errors in transcription may have occurred. Pursuant to the emergency declaration under the [facility], [facility] waiver authority and the [employer] Act, this Virtual  Visit was conducted, with patient's consent, to reduce the patient's risk of exposure to COVID-19 and provide continuity of care for an established patient. Services were provided through a video synchronous discussion virtually to substitute for in-person clinic visit. Patient was instructed that the AVS is available on My Chart or was emailed to the patient if not on My Chart. Lab orders were emailed to patient if they do not use a [facility] lab. Any work notes were sent to patient through My Chart or email.

## 2021-06-21 ENCOUNTER — HOSPITAL ENCOUNTER (OUTPATIENT)
Dept: PHYSICAL THERAPY | Age: 41
Setting detail: THERAPIES SERIES
Discharge: HOME | End: 2021-06-21
Payer: MEDICARE

## 2021-06-21 PROCEDURE — 97161 PT EVAL LOW COMPLEX 20 MIN: CPT | Performed by: PHYSICAL THERAPIST

## 2021-06-21 PROCEDURE — 97140 MANUAL THERAPY 1/> REGIONS: CPT | Performed by: PHYSICAL THERAPIST

## 2021-06-21 PROCEDURE — 97110 THERAPEUTIC EXERCISES: CPT | Performed by: PHYSICAL THERAPIST

## 2021-06-21 NOTE — PLAN OF CARE
The [facility] and [facility], [address] [facility], [employer] [place], [place]    Physical Therapy Certification    Dear Referring Practitioner: Dr Oswaldo Carpenter DO,    We had the pleasure of evaluating the following patient for physical therapy services at [address] [place] [address]. A summary of our findings can be found in the initial assessment below. This includes our plan of care. If you have any questions or concerns regarding these findings, please do not hesitate to contact me at the office phone number checked above. Thank you for the referral.       Physician Signature:_______________________________Date:__________________  By signing above (or electronic signature), therapists plan is approved by physician    Patient: Yulia Franco   : 1980   MRN: [id number]  Referring Physician: Referring Practitioner: Dr Oswaldo Carpenter DO      Evaluation Date: 2021      Medical Diagnosis Information:  Diagnosis: N64.993O (ICD-10-CM) - Low back strain, initial encounter   Treatment Diagnosis: LBP M54.5                                         Insurance information:  [employer]       Precautions/ Contra-indications: none    C-SSRS Triggered by Intake questionnaire (Past 2 wk assessment):   [x] No, Questionnaire did not trigger screening.   [] Yes, Patient intake triggered further evaluation      [] C-SSRS Screening completed  [] PCP notified via Plan of Care  [] Emergency services notified     Latex Allergy:  [x]NO      []YES  Preferred Language for Healthcare:   [x]English       []other:    SUBJECTIVE: Patient stated complaint:P reports progressive worsening of LBP over the last few years without known JOSE. It limits his sleep enough that he finally decided to talk to his PCP regarding.     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    Living Status/Prior Level of Function: Independent with ADLs and IADLs, gym work outs at apartment gym    OBJECTIVE:     ROM LEFT RIGHT   LUMBAR FLEX 55 mild pain    LUMBAR EXT 15 pain    Sidebend 15 pain R 27   Rotation      LEFT RIGHT   HIP Flex WNL WNL   HIP Abd     HIP ER WNL WFL   HIP IR WNL Mod decr   Knee Flex     Knee ext     Hamstring FLEX - -   Piriformis  - -   iliopsoas  +        Strength  LEFT RIGHT   MfA     TrA     HIP Flexors 4 w/ R LBP 4   HIP Abductors 5 4+   HIP ext 4 4   Hip IR     Knee EXT (quad) 5 5-   Knee Flex (HS) 4 4   Ankle DF 5 5   Ankle PF 5 5   Great Toe Ext 5 5     Reflexes/Sensation:    [x]Dermatomes/Myotomes intact    [x]UE Reflexes     [x]Normal []Hypo      []Hyper   [x]LE Reflexes     [x]Normal []Hypo      []Hyper   []Clonus/Hoffmans:    []Other:    Joint mobility:    []Normal    []Hypo   []Hyper    Palpation: tenderness R iliacus, R obliques, R QL    Functional Mobility/Transfers: indep    Posture: R iliac crest high in standing    Bandages/Dressings/Incisions: NA    Gait: (include devices/WB status) NT    Orthopedic Special Tests: + R longer than L in supine and sitting, (-) supine to sit test                       [x] Patient history, allergies, meds reviewed. Medical chart reviewed. See intake form. Review Of Systems (ROS):  [x]Performed Review of systems (Integumentary, CardioPulmonary, Neurological) by intake and observation. Intake form has been scanned into medical record. Patient has been instructed to contact their primary care physician regarding ROS issues if not already being addressed at this time.       Co-morbidities/Complexities (which (from functional questionnaire and intake)   [x]Reduced ability to tolerate prolonged functional positions   [x]Reduced ability or difficulty with changes of positions or transfers between positions   []Reduced ability to maintain good posture and demonstrate good body mechanics with sitting, bending, and lifting   [x]Reduced ability to sleep   [] Reduced ability or tolerance with driving and/or computer work   []Reduced ability to perform lifting, reaching, carrying tasks   []Reduced ability to squat   []Reduced ability to forward bend   [x]Reduced ability to ambulate prolonged functional periods/distances/surfaces   []Reduced ability to ascend/descend stairs   []other:       Participation Restrictions   []Reduced participation in self care activities   [x]Reduced participation in home management activities   []Reduced participation in work activities   [x]Reduced participation in social activities. []Reduced participation in sport/recreation activities. Classification:   [x]Signs/symptoms consistent with Lumbar instability/stabilization subgroup. []Signs/symptoms consistent with Lumbar mobilization/manipulation subgroup, myotomes and dermatomes intact. Meets manipulation criteria. []Signs/symptoms consistent with Lumbar direction specific/centralization subgroup   []Signs/symptoms consistent with Lumbar traction subgroup     []Signs/symptoms consistent with lumbar facet dysfunction   []Signs/symptoms consistent with lumbar stenosis type dysfunction   []Signs/symptoms consistent with nerve root involvement including myotome & dermatome dysfunction   []Signs/symptoms consistent with post-surgical status including: decreased ROM, strength and function.    [x]signs/symptoms consistent with pathology which may benefit from Dry needling     []other:      Prognosis/Rehab Potential:      []Excellent   [x]Good    []Fair   []Poor    Tolerance of evaluation/treatment: []Excellent   [x]Good    []Fair   []Poor  Physical Therapy Evaluation Complexity Justification  [x] A history of present problem with:  [] no personal factors and/or comorbidities that impact the plan of care;  [x]1-2 personal factors and/or comorbidities that impact the plan of care  []3 personal factors and/or comorbidities that impact the plan of care  [x] An examination of body systems using standardized tests and measures addressing any of the following: body structures and functions (impairments), activity limitations, and/or participation restrictions;:  [x] a total of 1-2 or more elements   [] a total of 3 or more elements   [] a total of 4 or more elements   [x] A clinical presentation with:  [x] stable and/or uncomplicated characteristics   [] evolving clinical presentation with changing characteristics  [] unstable and unpredictable characteristics;   [x] Clinical decision making of [x] low, [] moderate, [] high complexity using standardized patient assessment instrument and/or measurable assessment of functional outcome. [x] EVAL (LOW) 17465 (typically 20 minutes face-to-face)  [] EVAL (MOD) 65070 (typically 30 minutes face-to-face)  [] EVAL (HIGH) 94606 (typically 45 minutes face-to-face)  [] RE-EVAL         PLAN: Begin PT focusing on: proximal hip mobilizations, LB mobs, LB core activation, proximal hip activation, and HEP    Frequency/Duration:  1-2 days per week for 6 Weeks:  Interventions:  [x]  Therapeutic exercise including: strength training, ROM, for LE, Glutes and core   [x]  NMR activation and proprioception for glutes , LE and Core   [x]  Manual therapy as indicated for Hip complex, LE and spine to include: Dry Needling/IASTM, STM, PROM, Gr I-IV mobilizations, manipulation.    [x]  Modalities as needed that may include: thermal agents, E-stim, Biofeedback, US, iontophoresis as indicated  [x]  Patient education on joint protection, postural re-education, activity modification, progression of HEP. HEP instruction: (see below)    GOALS:  Patient stated goal: pain free sleeping  [] Progressing: [] Met: [] Not Met: [] Adjusted    Therapist goals for Patient:   Short Term Goals: To be achieved in: 2 weeks  1. Independent in HEP and progression per patient tolerance, in order to prevent re-injury. [] Progressing: [] Met: [] Not Met: [] Adjusted  2. Patient will have a decrease in pain to facilitate improvement in movement, function, and ADLs as indicated by Functional Deficits. [] Progressing: [] Met: [] Not Met: [] Adjusted      Long Term Goals: To be achieved in: 6 weeks  1. Disability index score of 10% or less for the modODI  to assist with reaching prior level of function. [] Progressing: [] Met: [] Not Met: [] Adjusted  2. Patient will demonstrate increased AROM to WNL, good LS mobility, good hip ROM to allow for proper joint functioning as indicated by patients Functional Deficits. [] Progressing: [] Met: [] Not Met: [] Adjusted  3. Patient will demonstrate an increase in Strength to good proximal hip and core activation to allow for proper functional mobility as indicated by patients Functional Deficits. [] Progressing: [] Met: [] Not Met: [] Adjusted  4. Patient will return to sleeping through the night without increased symptoms or restriction due to his hip/back pain. [] Progressing: [] Met: [] Not Met: [] Adjusted         Electronically signed by:  Michoacano Wang PT    Access Code: [email or web address]  URL: [email or web address]  Date: 06/21/2021  Prepared by: Michoacano Wang    Exercises  Supine Bridge - 1-2 x daily - 7 x weekly - 2-3 sets - 10 reps  Sidelying Thoracic Rotation with Open Book - 1-2 x daily - 7 x weekly - 1-2 sets - 5-10 reps - 10-15 seconds hold  Half Kneeling Hip Flexor Stretch with Sidebend - 1-2 x daily - 7 x weekly - 3 sets - 15-30 seconds hold  Hip Flexor Mobilization with Foam Roll - 1-2 x daily - 7 x weekly - 1-3 sets - 30-60 seconds hold

## 2021-06-21 NOTE — FLOWSHEET NOTE
The [address] [place],[address], [address] [place], [place]    Physical Therapy Treatment Note/ Progress Report:           Date:  2021    Patient Name:  Carmen Parks"  :  1980  MRN: [id number]  Restrictions/Precautions:    Medical/Treatment Diagnosis Information:  · Diagnosis: S39.012A (ICD-10-CM) - Low back strain, initial encounter  · Treatment Diagnosis: LBP T59.2  Insurance/Certification information:   [employer]  Physician Information:  Referring Practitioner: Dr Oswaldo Carpenter DO  Has the plan of care been signed (Y/N):        []  Yes  [x]  No     Date of Patient follow up with Physician: prn      Is this a Progress Report:     []  Yes  [x]  No        If Yes:  Date Range for reporting period:  Beginnin21  Ending:     Progress report will be due (10 Rx or 30 days whichever is less): 65       Recertification will be due (POC Duration  / 90 days whichever is less): 6weeks         Visit # Insurance Allowable Auth Required   In-person 1 BMN, no auth []  Yes []  No    Telehealth   []  Yes []  No    Total            Functional Scale: modODI 36%    Date assessed:  21      Therapy Diagnosis/Practice Pattern:F, conservative      Number of Comorbidities:  []0     [x]1-2    []3+    Latex Allergy:  [x]NO      []YES  Preferred Language for Healthcare:   [x]English       []other:      Pain level:  eval 5/10    SUBJECTIVE:  See eval    OBJECTIVE: See eval   Observation:    Test measurements:      RESTRICTIONS/PRECAUTIONS: anxiety, ADHD, smoker, MVP    Exercises/Interventions:     Therapeutic Ex (58188) Sets/sec/reps Notes/CUES   Supine bridge 2x10 HEP   sidelying open book stretch 10x10\" HEP   Kneeling hip flexor S w/ SB 3x30\" HEP                                           Pt ed: eval findings, POC, HEP, activity mod, heat vs ice, self massage with tennis ball, hip/spine anatomy x10'    Manual Intervention (71681) and allowing for proper ROM for normal function with self care, mobility, lifting and ambulation. Modalities:       Charges  Timed Code Treatment Minutes: 25   Total Treatment Minutes: 45     [x] EVAL (LOW) 13968   [] EVAL (MOD) 62030   [] EVAL (HIGH) 25988   [] RE-EVAL     [x] BQ(82791) x  1   [] IONTO  [] NMR (17502) x     [] VASO  [x] Manual (27238) x 1     [] Other:  [] TA x      [] Mech Traction (68288)  [] ES(attended) (10753)      [] ES (un) (43752):     Goals:   Patient stated goal: pain free sleeping  [] Progressing: [] Met: [] Not Met: [] Adjusted    Therapist goals for Patient:   Short Term Goals: To be achieved in: 2 weeks  1. Independent in HEP and progression per patient tolerance, in order to prevent re-injury. [] Progressing: [] Met: [] Not Met: [] Adjusted  2. Patient will have a decrease in pain to facilitate improvement in movement, function, and ADLs as indicated by Functional Deficits. [] Progressing: [] Met: [] Not Met: [] Adjusted      Long Term Goals: To be achieved in: 6 weeks  1. Disability index score of 10% or less for the modODI  to assist with reaching prior level of function. [] Progressing: [] Met: [] Not Met: [] Adjusted  2. Patient will demonstrate increased AROM to WNL, good LS mobility, good hip ROM to allow for proper joint functioning as indicated by patients Functional Deficits. [] Progressing: [] Met: [] Not Met: [] Adjusted  3. Patient will demonstrate an increase in Strength to good proximal hip and core activation to allow for proper functional mobility as indicated by patients Functional Deficits. [] Progressing: [] Met: [] Not Met: [] Adjusted  4. Patient will return to sleeping through the night without increased symptoms or restriction due to his hip/back pain. [] Progressing: [] Met: [] Not Met: [] Adjusted          Progression Towards Functional goals:  [] Patient is progressing as expected towards functional goals listed.     [] Progression is slowed due to complexities listed. [] Progression has been slowed due to co-morbidities. [x] Plan just implemented, too soon to assess goals progression  [] Other:     Overall Progression Towards Functional goals/ Treatment Progress Update:  [] Patient is progressing as expected towards functional goals listed. [] Progression is slowed due to complexities/Impairments listed. [] Progression has been slowed due to co-morbidities. [x] Plan just implemented, too soon to assess goals progression <30days   [] Goals require adjustment due to lack of progress  [] Patient is not progressing as expected and requires additional follow up with physician  [] Other    Prognosis for POC: [x] Good [] Fair  [] Poor      Patient requires continued skilled intervention: [x] Yes  [] No    Treatment/Activity Tolerance:  [x] Patient able to complete treatment  [] Patient limited by fatigue  [] Patient limited by pain    [] Patient limited by other medical complications  [] Other:     ASSESSMENT: see eval    Return to Play: (if applicable)   []  Stage 1: Intro to Strength   []  Stage 2: Return to Run and Strength   []  Stage 3: Return to Jump and Strength   []  Stage 4: Dynamic Strength and Agility   []  Stage 5: Sport Specific Training     []  Ready to Return to Play, Meets All Above Stages   []  Not Ready for Return to Sports   Comments:                               PLAN: See eval  [] Continue per plan of care [] Alter current plan (see comments above)  [x] Plan of care initiated [] Hold pending MD visit [] Discharge      Electronically signed by:  Anjana Cruz PT    Note: If patient does not return for scheduled/ recommended follow up visits, this note will serve as a discharge from care along with most recent update on progress. Access Code: [email or web address]  URL: [employer]/  Date: 06/21/2021  Prepared by: Anjana Cruz     Exercises  Supine Bridge - 1-2 x daily - 7 x weekly - 2-3 sets - 10 reps  Sidelying Thoracic Rotation with Open Book - 1-2 x daily - 7 x weekly - 1-2 sets - 5-10 reps - 10-15 seconds hold  Half Kneeling Hip Flexor Stretch with Sidebend - 1-2 x daily - 7 x weekly - 3 sets - 15-30 seconds hold  Hip Flexor Mobilization with Foam Roll - 1-2 x daily - 7 x weekly - 1-3 sets - 30-60 seconds hold

## 2021-06-23 ENCOUNTER — HOSPITAL ENCOUNTER (OUTPATIENT)
Dept: PHYSICAL THERAPY | Age: 41
Setting detail: THERAPIES SERIES
Discharge: HOME | End: 2021-06-23
Payer: MEDICARE

## 2021-06-23 PROCEDURE — 97110 THERAPEUTIC EXERCISES: CPT | Performed by: PHYSICAL THERAPIST

## 2021-06-23 PROCEDURE — 97140 MANUAL THERAPY 1/> REGIONS: CPT | Performed by: PHYSICAL THERAPIST

## 2021-06-23 NOTE — FLOWSHEET NOTE
The [address] [facility],[address], [address] [facility], [address], [address]    Physical Therapy Treatment Note/ Progress Report:           Date:  2021    Patient Name:  Jasper Rosario"  :  1980  MRN: [id number]  Restrictions/Precautions:    Medical/Treatment Diagnosis Information:  · Diagnosis: S39.012A (ICD-10-CM) - Low back strain, initial encounter  · Treatment Diagnosis: LBP O33.5  Insurance/Certification information:   [employer]  Physician Information:  Referring Practitioner: Dr Ramona Vo DO  Has the plan of care been signed (Y/N):        []  Yes  [x]  No     Date of Patient follow up with Physician: prn      Is this a Progress Report:     []  Yes  [x]  No        If Yes:  Date Range for reporting period:  Beginnin21  Ending:     Progress report will be due (10 Rx or 30 days whichever is less):        Recertification will be due (POC Duration  / 90 days whichever is less): 6weeks         Visit # Insurance Allowable Auth Required   In-person 2 BMN, no auth []  Yes []  No    Telehealth   []  Yes []  No    Total            Functional Scale: modODI 36%    Date assessed:  21      Therapy Diagnosis/Practice Pattern:F, conservative      Number of Comorbidities:  []0     [x]1-2    []3+    Latex Allergy:  [x]NO      []YES  Preferred Language for Healthcare:   [x]English       []other:      Pain level:  eval 5/10  Current 5/10 mid T-spine    SUBJECTIVE:  Pt reports his HEP is going well, but he has developed some mid-thoracic pain that started yesterday. He reports this is something that bothers him from time to time.     OBJECTIVE:   Observation: stiffness general T-spine, cassius at T4-5 L>R with increased PSM tone T4-T10   Test measurements:      RESTRICTIONS/PRECAUTIONS: anxiety, ADHD, smoker, MVP    Exercises/Interventions:     Therapeutic Ex (86572) Sets/sec/reps Notes/CUES   Supine bridge HEP   sidelying open book stretch HEP Kneeling hip flexor S w/ SB 3x30\" R/LHEP, form cues        quadruped alt LE's  3\" x10 R/L HEP   SB prone LE's  SB prone opposition x5 R/L  x10 R/L   HEP, VTC's for R UE/L LE   Self t-spine mobs over FR x2'                        Pt ed: , HEP, activity mod, heat vs ice, y, self mob w/ FR vs over chair back, MFD  x8'    Manual Intervention (31450)     DTM/iliacu release, DTM along iliac crest     MFD 6 cups T4, T10, L4 X5', w/ cat/cow x10 reps    Prone thoracic rotation mobs T4-T9  Central and unilat PA's T4-5-6  Seated thrust CTJ, T3  STM L>R mid thoracic PSM x6'  x4'  2x ea  x5'                   NMR re-education (03514)  CUES NEEDED                                                Therapeutic Activity (33797)                                       Therapeutic Exercise and NMR EXR  [x] (89714) Provided verbal/tactile cueing for activities related to strengthening, flexibility, endurance, ROM  for improvements in proximal hip and core control with self care, mobility, lifting and ambulation. [x] (13408) Provided verbal/tactile cueing for activities related to improving balance, coordination, kinesthetic sense, posture, motor skill, proprioception  to assist with core control in self care, mobility, lifting, and ambulation.      Therapeutic Activities:    [] (64419 or 19983) Provided verbal/tactile cueing for activities related to improving balance, coordination, kinesthetic sense, posture, motor skill, proprioception and motor activation to allow for proper function  with self care and ADLs  [] (56876) Provided training and instruction to the patient for proper core and proximal hip recruitment and positioning with ambulation re-education     Home Exercise Program:    [x] (17456) Reviewed/Progressed HEP activities related to strengthening, flexibility, endurance, ROM of core, proximal hip and LE for functional self-care, mobility, lifting and ambulation   [] (96801) Reviewed/Progressed HEP activities related to improving balance, coordination, kinesthetic sense, posture, motor skill, proprioception of core, proximal hip and LE for self care, mobility, lifting, and ambulation      Manual Treatments:  PROM / STM / Oscillations-Mobs:  G-I, II, III, IV (PA's, Inf., Post.)  [x] (06203) Provided manual therapy to mobilize proximal hip and LS spine soft tissue/joints for the purpose of modulating pain, promoting relaxation,  increasing ROM, reducing/eliminating soft tissue swelling/inflammation/restriction, improving soft tissue extensibility and allowing for proper ROM for normal function with self care, mobility, lifting and ambulation. Modalities:   MHP in supine w/ wedge x15'    Charges  Timed Code Treatment Minutes: 45   Total Treatment Minutes: 60     [] EVAL (LOW) 27097   [] EVAL (MOD) 65301   [] EVAL (HIGH) 01810   [] RE-EVAL     [x] MP(33930) x  1   [] IONTO  [] NMR (96575) x     [] VASO  [x] Manual (00336) x 2     [] Other:  [] TA x      [] Mech Traction (40804)  [] ES(attended) (07342)      [] ES (un) (79200):     Goals:   Patient stated goal: pain free sleeping  [] Progressing: [] Met: [] Not Met: [] Adjusted    Therapist goals for Patient:   Short Term Goals: To be achieved in: 2 weeks  1. Independent in HEP and progression per patient tolerance, in order to prevent re-injury. [] Progressing: [] Met: [] Not Met: [] Adjusted  2. Patient will have a decrease in pain to facilitate improvement in movement, function, and ADLs as indicated by Functional Deficits. [] Progressing: [] Met: [] Not Met: [] Adjusted      Long Term Goals: To be achieved in: 6 weeks  1. Disability index score of 10% or less for the modODI  to assist with reaching prior level of function. [] Progressing: [] Met: [] Not Met: [] Adjusted  2. Patient will demonstrate increased AROM to WNL, good LS mobility, good hip ROM to allow for proper joint functioning as indicated by patients Functional Deficits.    [] Progressing: [] Met: [] Not Met: [] Jump and Strength   []  Stage 4: Dynamic Strength and Agility   []  Stage 5: Sport Specific Training     []  Ready to Return to Play, Meets All Above Stages   []  Not Ready for Return to Sports   Comments:                         PLAN: Monitor response to MFD and manual techniques  [x] Continue per plan of care [] Alter current plan (see comments above)  [] Plan of care initiated [] Hold pending MD visit [] Discharge      Electronically signed by:  Agustin Wagner PT    Note: If patient does not return for scheduled/ recommended follow up visits, this note will serve as a discharge from care along with most recent update on progress. Access Code: [email or web address]  URL: [employer]/  Date: 06/21/2021  Prepared by: Agustin Wagner     Exercises  Supine Bridge - 1-2 x daily - 7 x weekly - 2-3 sets - 10 reps  Sidelying Thoracic Rotation with Open Book - 1-2 x daily - 7 x weekly - 1-2 sets - 5-10 reps - 10-15 seconds hold  Half Kneeling Hip Flexor Stretch with Sidebend - 1-2 x daily - 7 x weekly - 3 sets - 15-30 seconds hold  Hip Flexor Mobilization with Foam Roll - 1-2 x daily - 7 x weekly - 1-3 sets - 30-60 seconds hold

## 2021-09-10 ENCOUNTER — VIRTUAL VISIT (OUTPATIENT)
Dept: FAMILY MEDICINE CLINIC | Age: 41
End: 2021-09-10
Payer: MEDICARE

## 2021-09-10 DIAGNOSIS — M95.5 PELVIS TILTED: ICD-10-CM

## 2021-09-10 DIAGNOSIS — N52.9 ERECTILE DYSFUNCTION, UNSPECIFIED ERECTILE DYSFUNCTION TYPE: ICD-10-CM

## 2021-09-10 DIAGNOSIS — M79.672 BILATERAL FOOT PAIN: ICD-10-CM

## 2021-09-10 DIAGNOSIS — F98.8 ADULT ATTENTION DEFICIT DISORDER: Primary | ICD-10-CM

## 2021-09-10 DIAGNOSIS — M79.671 BILATERAL FOOT PAIN: ICD-10-CM

## 2021-09-10 DIAGNOSIS — S39.012A BACK STRAIN, INITIAL ENCOUNTER: ICD-10-CM

## 2021-09-10 PROCEDURE — 4004F PT TOBACCO SCREEN RCVD TLK: CPT | Performed by: FAMILY MEDICINE

## 2021-09-10 PROCEDURE — G8421 BMI NOT CALCULATED: HCPCS | Performed by: FAMILY MEDICINE

## 2021-09-10 PROCEDURE — 99213 OFFICE O/P EST LOW 20 MIN: CPT | Performed by: FAMILY MEDICINE

## 2021-09-10 PROCEDURE — G8427 DOCREV CUR MEDS BY ELIG CLIN: HCPCS | Performed by: FAMILY MEDICINE

## 2021-09-10 RX ORDER — DEXTROAMPHETAMINE SACCHARATE, AMPHETAMINE ASPARTATE MONOHYDRATE, DEXTROAMPHETAMINE SULFATE AND AMPHETAMINE SULFATE 7.5; 7.5; 7.5; 7.5 MG/1; MG/1; MG/1; MG/1
30 CAPSULE, EXTENDED RELEASE ORAL EVERY MORNING
Qty: 30 CAPSULE | Refills: 0 | Status: SHIPPED | OUTPATIENT
Start: 2021-11-09 | End: 2021-12-17 | Stop reason: SDUPTHER

## 2021-09-10 RX ORDER — DEXTROAMPHETAMINE SACCHARATE, AMPHETAMINE ASPARTATE, DEXTROAMPHETAMINE SULFATE AND AMPHETAMINE SULFATE 7.5; 7.5; 7.5; 7.5 MG/1; MG/1; MG/1; MG/1
30 TABLET ORAL 2 TIMES DAILY
Qty: 60 TABLET | Refills: 0 | Status: SHIPPED | OUTPATIENT
Start: 2021-10-10 | End: 2021-12-17 | Stop reason: SDUPTHER

## 2021-09-10 RX ORDER — DEXTROAMPHETAMINE SACCHARATE, AMPHETAMINE ASPARTATE MONOHYDRATE, DEXTROAMPHETAMINE SULFATE AND AMPHETAMINE SULFATE 7.5; 7.5; 7.5; 7.5 MG/1; MG/1; MG/1; MG/1
30 CAPSULE, EXTENDED RELEASE ORAL EVERY MORNING
Qty: 30 CAPSULE | Refills: 0 | Status: SHIPPED | OUTPATIENT
Start: 2021-10-10 | End: 2021-12-17 | Stop reason: SDUPTHER

## 2021-09-10 RX ORDER — DEXTROAMPHETAMINE SACCHARATE, AMPHETAMINE ASPARTATE MONOHYDRATE, DEXTROAMPHETAMINE SULFATE AND AMPHETAMINE SULFATE 7.5; 7.5; 7.5; 7.5 MG/1; MG/1; MG/1; MG/1
30 CAPSULE, EXTENDED RELEASE ORAL EVERY MORNING
Qty: 30 CAPSULE | Refills: 0 | Status: SHIPPED | OUTPATIENT
Start: 2021-09-10 | End: 2021-12-17 | Stop reason: SDUPTHER

## 2021-09-10 RX ORDER — CYCLOBENZAPRINE HCL 10 MG
10 TABLET ORAL 3 TIMES DAILY PRN
Qty: 60 TABLET | Refills: 1 | Status: SHIPPED | OUTPATIENT
Start: 2021-09-10 | End: 2021-11-14

## 2021-09-10 RX ORDER — SILDENAFIL 50 MG/1
50 TABLET, FILM COATED ORAL PRN
Qty: 10 TABLET | Refills: 3 | Status: SHIPPED | OUTPATIENT
Start: 2021-09-10 | End: 2021-12-17 | Stop reason: SDUPTHER

## 2021-09-10 RX ORDER — DEXTROAMPHETAMINE SACCHARATE, AMPHETAMINE ASPARTATE, DEXTROAMPHETAMINE SULFATE AND AMPHETAMINE SULFATE 7.5; 7.5; 7.5; 7.5 MG/1; MG/1; MG/1; MG/1
30 TABLET ORAL 2 TIMES DAILY
Qty: 60 TABLET | Refills: 0 | Status: SHIPPED | OUTPATIENT
Start: 2021-11-09 | End: 2021-12-17 | Stop reason: SDUPTHER

## 2021-09-10 RX ORDER — DEXTROAMPHETAMINE SACCHARATE, AMPHETAMINE ASPARTATE, DEXTROAMPHETAMINE SULFATE AND AMPHETAMINE SULFATE 7.5; 7.5; 7.5; 7.5 MG/1; MG/1; MG/1; MG/1
30 TABLET ORAL 2 TIMES DAILY
Qty: 60 TABLET | Refills: 0 | Status: SHIPPED | OUTPATIENT
Start: 2021-09-10 | End: 2021-12-17 | Stop reason: SDUPTHER

## 2021-09-10 NOTE — PROGRESS NOTES
[facility]  TELEMEDICINE Progress Note  Yonatan Chiu DO      The risks and benefits of converting to a virtual visit were discussed in light of the current infectious disease epidemic. Patient also understood that insurance coverage and co-pays are up to their individual insurance plans. Patient identification was verified at the start of the visit. Ellis Dueñas  1980    09/10/21    Patient location: Home address on file  Provider location: Physician's home    Chief Complaint:   Ellis Dueñas is a 36 y.o. patient who is here for ADD        HPI:   Doing well with ADD medication. Denies palpitations, headaches or insomnia. Denies increased anxiety while on medication. Does not endorse hallucinations, delusional thinking, aggression, hostility or any manic episodes. [employer] now. Requests podiatry referral. Referred to Dr. Dianne Sanchez. ROS negative for headache, vision changes, chest pain, shortness of breath, abdominal pain, urinary sx, bowel changes. Past medical, surgical, and social history reviewed. Medications and allergies reviewed. Allergies   Allergen Reactions    Lactose Intolerance (Gi) Nausea Only     Prior to Visit Medications    Medication Sig Taking? Authorizing Provider   amphetamine-dextroamphetamine (ADDERALL) 30 MG tablet Take 1 tablet by mouth 2 times daily for 60 doses. Take 1 tab po po mid-day. Yes Radha Mishraerfield Montemayor, DO   amphetamine-dextroamphetamine (ADDERALL) 30 MG tablet Take 1 tablet by mouth 2 times daily for 60 doses. Take 1 tab po po mid-day. Yes Radha Mishraerfield Montemayor, DO   amphetamine-dextroamphetamine (ADDERALL, 30MG,) 30 MG tablet Take 1 tablet by mouth 2 times daily for 30 days. Take 1 tab po mid-day. Yes Garcia Talentfield Montemayor, DO   amphetamine-dextroamphetamine (ADDERALL XR) 30 MG extended release capsule Take 1 capsule by mouth every morning for 30 days.  Yes Garcia Talentfield Montemayor, DO   amphetamine-dextroamphetamine (ADDERALL XR) 30 MG extended release capsule Take 1 capsule by mouth every morning for 30 days. Yes Pleas Dural Bingcang, DO   amphetamine-dextroamphetamine (ADDERALL XR) 30 MG extended release capsule Take 1 capsule by mouth every morning for 30 days. Yes Pleas Dural Bingcang, DO   cyclobenzaprine (FLEXERIL) 10 MG tablet Take 1 tablet by mouth 3 times daily as needed for Muscle spasms Yes Pleas Dural Bingcang, DO   sildenafil (VIAGRA) 50 MG tablet Take 1 tablet by mouth as needed for Erectile Dysfunction Yes Pleas Dural Bingcang, DO   gabapentin (NEURONTIN) 300 MG capsule Take 1 capsule po TID. Yes Pleas Dural Bingcang, DO   tiZANidine (ZANAFLEX) 4 MG tablet Take 1 tablet by mouth 3 times daily as needed (lower back pain) Yes Pleas Dural Bingcang, DO   varenicline (CHANTIX CONTINUING MONTH ROMERO) 1 MG tablet Take 1 tablet by mouth 2 times daily  Pleas Dural Bingcang, DO   gabapentin (NEURONTIN) 300 MG capsule Take 3 capsules by mouth 2 times daily for 30 days. Patient not taking: Reported on 9/10/2021  Pleas Dural Bingcang, DO          Patient-Reported Vitals 9/10/2021   Patient-Reported Weight 208   Patient-Reported Height 6'1\"   Patient-Reported Systolic 666   Patient-Reported Diastolic 89   Patient-Reported Temperature -      There were no vitals filed for this visit.    Wt Readings from Last 3 Encounters:   05/28/20 215 lb (97.5 kg)   02/21/20 235 lb 3.2 oz (106.7 kg)   12/02/19 232 lb 12.8 oz (105.6 kg)     BP Readings from Last 3 Encounters:   02/21/20 126/86   12/02/19 129/83   08/21/19 130/86       Patient Active Problem List   Diagnosis    Brachial plexus injury    Anxiety    History of ADHD    Tobacco use    Anxiety disorder    Cervical strain    Strength loss of left hand    Psychophysiological insomnia    Frequent urination    Elevated blood pressure reading    Acute gastritis without hemorrhage    Lipoma of abdominal wall    Chronic back pain       Immunization History Administered Date(s) Administered    COVID-19, Pfizer, PF, 30mcg/0.3mL 04/24/2021, 05/26/2021    HPV 9-valent Kajal Parkinson) 02/21/2020    Tdap (Boostrix, Adacel) 07/12/2016, 08/10/2017       Past Medical History:   Diagnosis Date    Anxiety     Chronic back pain 6/27/2019    H/O knee surgery     right    H/O shoulder surgery     left shoulder    Hypertension     IBS (irritable bowel syndrome)     MVP (mitral valve prolapse)     Neuropathy      No past surgical history on file. Family History   Problem Relation Age of Onset    High Blood Pressure Father     Heart Disease Father     Substance Abuse Father     Diabetes Paternal Grandmother      Social History     Socioeconomic History    Marital status: Single     Spouse name: Not on file    Number of children: Not on file    Years of education: Not on file    Highest education level: Not on file   Occupational History    Not on file   Tobacco Use    Smoking status: Heavy Tobacco Smoker     Packs/day: 1.00     Years: 22.00     Pack years: 22.00     Types: Cigarettes    Smokeless tobacco: Former User   Substance and Sexual Activity    Alcohol use: Yes     Comment: twice weekly    Drug use: Yes     Types: Opiates      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  Sexual activity: Never   Other Topics Concern    Not on file   Social History Narrative    Not on file     Social Determinants of Health     Financial Resource Strain: High Risk    Difficulty of Paying Living Expenses: Very hard   Food Insecurity: Food Insecurity Present    Worried About Running Out of Food in the Last Year: Often true    Ramsey of Food in the Last Year: Often true   Transportation Needs: Unmet Transportation Needs    Lack of Transportation (Medical):  Yes    Lack of Transportation (Non-Medical): Yes   Physical Activity:     Days of Exercise per Week:     Minutes of Exercise per Session:    Stress:     Feeling of Stress :    Social Connections:     Frequency of Communication with Friends and Family:     Frequency of Social Gatherings with Friends and Family:     Attends Zoroastrian Services:     Active Member of Clubs or Organizations:     Attends Club or Organization Meetings:     Marital Status:    Intimate Partner Violence:     Fear of Current or Ex-Partner:     Emotionally Abused:     Physically Abused:     Sexually Abused:        O: There were no vitals taken for this visit. Physical Exam  PHYSICAL EXAMINATION:  [ INSTRUCTIONS:  \"[x]\" Indicates a positive item  \"[]\" Indicates a negative item  -- DELETE ALL ITEMS NOT EXAMINED]  Vital Signs: (As obtained by patient/caregiver or practitioner observation)    Constitutional: [x] Appears well-developed and well-nourished [x] No apparent distress      [] Abnormal-   Mental status  [x] Alert and awake  [x] Oriented to person/place/time [x]Able to follow commands      Eyes:  EOM    [x]  Normal  [] Abnormal-  Sclera  [x]  Normal  [] Abnormal -         Discharge [x]  None visible  [] Abnormal -    HENT:   [x] Normocephalic, atraumatic.   [] Abnormal   [] Mouth/Throat: Mucous membranes are moist.     External Ears [x] Normal  [] Abnormal-     Neck: [x] No visualized mass     Pulmonary/Chest: [x] Respiratory effort normal.  [x] No visualized signs of difficulty breathing or respiratory distress        [] Abnormal-      Musculoskeletal:   [] Normal gait with no signs of ataxia         [x] Normal range of motion of neck        [] Abnormal-       Neurological:        [x] No Facial Asymmetry (Cranial nerve 7 motor function) (limited exam to video visit)          [x] No gaze palsy        [] Abnormal-         Skin:        [x] No significant exanthematous lesions or discoloration noted on facial skin         [] Abnormal-            Psychiatric:       [x] Normal Affect [] No Hallucinations        [] Abnormal-     Other pertinent observable physical exam findings- n/a    Due to this being a TeleHealth encounter, evaluation of the following organ systems is limited: Vitals/Constitutional/EENT/Resp/CV/GI//MS/Neuro/Skin/Heme-Lymph-Imm. ASSESSMENT   Diagnosis Orders   1. Adult attention deficit disorder  amphetamine-dextroamphetamine (ADDERALL) 30 MG tablet    amphetamine-dextroamphetamine (ADDERALL) 30 MG tablet    amphetamine-dextroamphetamine (ADDERALL, 30MG,) 30 MG tablet    amphetamine-dextroamphetamine (ADDERALL XR) 30 MG extended release capsule    amphetamine-dextroamphetamine (ADDERALL XR) 30 MG extended release capsule    amphetamine-dextroamphetamine (ADDERALL XR) 30 MG extended release capsule   2. Pelvis tilted  External Referral To Chiropractic   3. Back strain, initial encounter  cyclobenzaprine (FLEXERIL) 10 MG tablet   4. Bilateral foot pain     5. Erectile dysfunction, unspecified erectile dysfunction type  sildenafil (VIAGRA) 50 MG tablet     #1: The current medical regimen is effective;  continue present plan and medications. Pt aware of need for every 3 month medication followup appointments, and that medication refills for benzodiazepines, narcotics and/or stimulants will only be given at appointment. The risks, benefits, potential side effects and barriers to medication use were addressed today. Understanding was acknowledged. Patient asked to follow-up if condition(s) do not improve as anticipated. #2-4: refer to podiatry. Appt scheduled for Monday Sept 13 Dr. Cassia Carlisle.     PLAN          Time spent on encounter (including any number of the following: review of labs, imaging, provider notes, outside hospital records; performing examination/evaluation; counseling patient and family; ordering medications/tests; placing referrals and communication with referring physicians; coordination of care, and documentation in the EHR): 24 minutes  Established E/M: 10-19 (46928), 20-29 (86690), 30-39 (74571), 40-54 (63398)   New E/M: 15-29 (87859), 30-44 (44237), 45-59 (06272), 60-74 (27652)  Telephone E/M: 5-10 (02063), 11-20 [phone]), 21-30 (47233)    If applicable, see additional patient information and instructions under \"Patient Instructions. \"    No follow-ups on file. There are no Patient Instructions on file for this visit. Please note a portion of this chart was generated using dragon dictation software. Although every effort was made to ensure the accuracy of this automated transcription, some errors in transcription may have occurred. Pursuant to the emergency declaration under the [address] [place] [address], [facility] waiver authority and the [employer] and [employer] Act, this Virtual  Visit was conducted, with patient's consent, to reduce the patient's risk of exposure to COVID-19 and provide continuity of care for an established patient. Services were provided through a video synchronous discussion virtually to substitute for in-person clinic visit. Patient was instructed that the AVS is available on My Chart or was emailed to the patient if not on My Chart. Lab orders were emailed to patient if they do not use a [facility] lab. Any work notes were sent to patient through My Chart or email.

## 2021-09-13 DIAGNOSIS — F41.9 ANXIETY: ICD-10-CM

## 2021-09-13 RX ORDER — ALPRAZOLAM 1 MG/1
TABLET ORAL
Qty: 90 TABLET | Refills: 2 | Status: SHIPPED | OUTPATIENT
Start: 2021-09-13 | End: 2021-12-15 | Stop reason: SDUPTHER

## 2021-11-12 DIAGNOSIS — S39.012A BACK STRAIN, INITIAL ENCOUNTER: ICD-10-CM

## 2021-11-12 NOTE — TELEPHONE ENCOUNTER
Requested Prescriptions     Pending Prescriptions Disp Refills    cyclobenzaprine (FLEXERIL) 10 MG tablet [Pharmacy Med Name: CYCLOBENZAPRINE 10MG TABLETS] 60 tablet 1     Sig: TAKE 1 TABLET BY MOUTH THREE TIMES DAILY AS NEEDED FOR MUSCLE SPASMS     Last ov 9/10/21  Last lab 7/7/17

## 2021-11-14 RX ORDER — CYCLOBENZAPRINE HCL 10 MG
TABLET ORAL
Qty: 60 TABLET | Refills: 1 | Status: SHIPPED | OUTPATIENT
Start: 2021-11-14 | End: 2021-12-17 | Stop reason: SDUPTHER

## 2021-12-14 ENCOUNTER — TELEPHONE (OUTPATIENT)
Dept: FAMILY MEDICINE CLINIC | Age: 41
End: 2021-12-14

## 2021-12-14 NOTE — TELEPHONE ENCOUNTER
----- Message from Phyllis Valentinebabak sent at 12/14/2021  3:36 PM EST -----  Subject: Message to Provider    QUESTIONS  Information for Provider? pt is requesting a vv for upcoming appt 1/3/2022   2:20 PM please, call pt to advise thank you   ---------------------------------------------------------------------------  --------------  CALL BACK INFO  What is the best way for the office to contact you? OK to leave message on   voicemail  Preferred Call Back Phone Number? [phone]  ---------------------------------------------------------------------------  --------------  SCRIPT ANSWERS  Relationship to Patient?  Self

## 2021-12-15 DIAGNOSIS — F41.9 ANXIETY: ICD-10-CM

## 2021-12-15 RX ORDER — ALPRAZOLAM 1 MG/1
TABLET ORAL
Qty: 12 TABLET | Refills: 0 | Status: SHIPPED | OUTPATIENT
Start: 2021-12-15 | End: 2021-12-17 | Stop reason: SDUPTHER

## 2021-12-15 NOTE — TELEPHONE ENCOUNTER
Requested Prescriptions     Pending Prescriptions Disp Refills    ALPRAZolam (XANAX) 1 MG tablet 90 tablet 2     Sig: TAKE 1 TABLET BY MOUTH THREE TIMES DAILY AS NEEDED FOR ANXIETY OR SLEEP     Pt has made an appt for Friday and is wondering if it would be possible to get a bridge of this medication. Please advise  Thank you    Pt states that the ECC was not helpful and didn't really look into the schedule to find an open slot for him.  He felt like he was just being rushed and not listened to

## 2021-12-15 NOTE — TELEPHONE ENCOUNTER
----- Message from Susan Cardoza sent at 12/14/2021  3:48 PM EST -----  Subject: Refill Request    QUESTIONS  Name of Medication? amphetamine-dextroamphetamine (ADDERALL) 30 MG tablet  Patient-reported dosage and instructions? Take 1 tablet by mouth 2 times   daily for 30 days. Take 1 tab po mid-day. How many days do you have left? 2  Preferred Pharmacy? [facility] #[id number]  Pharmacy phone number (if available)? [phone]  ---------------------------------------------------------------------------  --------------,  Name of Medication? amphetamine-dextroamphetamine (ADDERALL XR) 30 MG   extended release capsule  Patient-reported dosage and instructions? Take 1 capsule by mouth every   morning for 30 days. How many days do you have left? 2  Preferred Pharmacy? [facility] #[id number]  Pharmacy phone number (if available)? [phone]  ---------------------------------------------------------------------------  --------------,  Name of Medication? ALPRAZolam (XANAX) 1 MG tablet  Patient-reported dosage and instructions? TAKE 1 TABLET BY MOUTH THREE   TIMES DAILY AS NEEDED FOR ANXIETY OR SLEEP  How many days do you have left? 5  Preferred Pharmacy? [facility] #[id number]  Pharmacy phone number (if available)? [phone]  ---------------------------------------------------------------------------  --------------  Michelle CORTES  What is the best way for the office to contact you? OK to leave message on   voicemail  Preferred Call Back Phone Number?  [phone]

## 2021-12-15 NOTE — TELEPHONE ENCOUNTER
Can you let him know I sent bridge rx to [facility]? Diagnosis Orders   1.  Anxiety  ALPRAZolam (XANAX) 1 MG tablet

## 2021-12-17 ENCOUNTER — VIRTUAL VISIT (OUTPATIENT)
Dept: FAMILY MEDICINE CLINIC | Age: 41
End: 2021-12-17
Payer: MEDICARE

## 2021-12-17 DIAGNOSIS — N52.9 ERECTILE DYSFUNCTION, UNSPECIFIED ERECTILE DYSFUNCTION TYPE: ICD-10-CM

## 2021-12-17 DIAGNOSIS — S14.3XXA INJURY OF BRACHIAL PLEXUS, INITIAL ENCOUNTER: ICD-10-CM

## 2021-12-17 DIAGNOSIS — M95.5 PELVIS TILTED: ICD-10-CM

## 2021-12-17 DIAGNOSIS — F98.8 ADULT ATTENTION DEFICIT DISORDER: Primary | ICD-10-CM

## 2021-12-17 DIAGNOSIS — S39.012A BACK STRAIN, INITIAL ENCOUNTER: ICD-10-CM

## 2021-12-17 DIAGNOSIS — G89.29 CHRONIC LOW BACK PAIN WITH SCIATICA, SCIATICA LATERALITY UNSPECIFIED, UNSPECIFIED BACK PAIN LATERALITY: ICD-10-CM

## 2021-12-17 DIAGNOSIS — R20.2 PARESTHESIA OF BOTH HANDS: ICD-10-CM

## 2021-12-17 DIAGNOSIS — M54.40 CHRONIC LOW BACK PAIN WITH SCIATICA, SCIATICA LATERALITY UNSPECIFIED, UNSPECIFIED BACK PAIN LATERALITY: ICD-10-CM

## 2021-12-17 DIAGNOSIS — F41.9 ANXIETY: ICD-10-CM

## 2021-12-17 PROCEDURE — 99214 OFFICE O/P EST MOD 30 MIN: CPT | Performed by: FAMILY MEDICINE

## 2021-12-17 PROCEDURE — G8484 FLU IMMUNIZE NO ADMIN: HCPCS | Performed by: FAMILY MEDICINE

## 2021-12-17 PROCEDURE — G8421 BMI NOT CALCULATED: HCPCS | Performed by: FAMILY MEDICINE

## 2021-12-17 PROCEDURE — 4004F PT TOBACCO SCREEN RCVD TLK: CPT | Performed by: FAMILY MEDICINE

## 2021-12-17 PROCEDURE — G8427 DOCREV CUR MEDS BY ELIG CLIN: HCPCS | Performed by: FAMILY MEDICINE

## 2021-12-17 RX ORDER — DEXTROAMPHETAMINE SACCHARATE, AMPHETAMINE ASPARTATE, DEXTROAMPHETAMINE SULFATE AND AMPHETAMINE SULFATE 7.5; 7.5; 7.5; 7.5 MG/1; MG/1; MG/1; MG/1
30 TABLET ORAL 2 TIMES DAILY
Qty: 60 TABLET | Refills: 0 | Status: SHIPPED | OUTPATIENT
Start: 2022-01-16 | End: 2022-03-03 | Stop reason: SDUPTHER

## 2021-12-17 RX ORDER — DEXTROAMPHETAMINE SACCHARATE, AMPHETAMINE ASPARTATE MONOHYDRATE, DEXTROAMPHETAMINE SULFATE AND AMPHETAMINE SULFATE 7.5; 7.5; 7.5; 7.5 MG/1; MG/1; MG/1; MG/1
30 CAPSULE, EXTENDED RELEASE ORAL EVERY MORNING
Qty: 30 CAPSULE | Refills: 0 | Status: SHIPPED | OUTPATIENT
Start: 2022-01-16 | End: 2022-03-03 | Stop reason: SDUPTHER

## 2021-12-17 RX ORDER — DEXTROAMPHETAMINE SACCHARATE, AMPHETAMINE ASPARTATE MONOHYDRATE, DEXTROAMPHETAMINE SULFATE AND AMPHETAMINE SULFATE 7.5; 7.5; 7.5; 7.5 MG/1; MG/1; MG/1; MG/1
30 CAPSULE, EXTENDED RELEASE ORAL EVERY MORNING
Qty: 30 CAPSULE | Refills: 0 | Status: SHIPPED | OUTPATIENT
Start: 2021-12-17 | End: 2022-03-03 | Stop reason: SDUPTHER

## 2021-12-17 RX ORDER — ALPRAZOLAM 1 MG/1
TABLET ORAL
Qty: 90 TABLET | Refills: 2 | Status: SHIPPED | OUTPATIENT
Start: 2021-12-17 | End: 2022-03-03 | Stop reason: SDUPTHER

## 2021-12-17 RX ORDER — DEXTROAMPHETAMINE SACCHARATE, AMPHETAMINE ASPARTATE, DEXTROAMPHETAMINE SULFATE AND AMPHETAMINE SULFATE 7.5; 7.5; 7.5; 7.5 MG/1; MG/1; MG/1; MG/1
30 TABLET ORAL 2 TIMES DAILY
Qty: 60 TABLET | Refills: 0 | Status: SHIPPED | OUTPATIENT
Start: 2021-12-17 | End: 2022-03-03 | Stop reason: SDUPTHER

## 2021-12-17 RX ORDER — DEXTROAMPHETAMINE SACCHARATE, AMPHETAMINE ASPARTATE, DEXTROAMPHETAMINE SULFATE AND AMPHETAMINE SULFATE 7.5; 7.5; 7.5; 7.5 MG/1; MG/1; MG/1; MG/1
30 TABLET ORAL 2 TIMES DAILY
Qty: 60 TABLET | Refills: 0 | Status: SHIPPED | OUTPATIENT
Start: 2022-02-15 | End: 2022-03-03 | Stop reason: SDUPTHER

## 2021-12-17 RX ORDER — DEXTROAMPHETAMINE SACCHARATE, AMPHETAMINE ASPARTATE MONOHYDRATE, DEXTROAMPHETAMINE SULFATE AND AMPHETAMINE SULFATE 7.5; 7.5; 7.5; 7.5 MG/1; MG/1; MG/1; MG/1
30 CAPSULE, EXTENDED RELEASE ORAL EVERY MORNING
Qty: 30 CAPSULE | Refills: 0 | Status: SHIPPED | OUTPATIENT
Start: 2022-02-15 | End: 2022-03-03 | Stop reason: SDUPTHER

## 2021-12-17 RX ORDER — SILDENAFIL 50 MG/1
50 TABLET, FILM COATED ORAL PRN
Qty: 10 TABLET | Refills: 3 | Status: SHIPPED | OUTPATIENT
Start: 2021-12-17 | End: 2022-03-03 | Stop reason: SDUPTHER

## 2021-12-17 RX ORDER — CYCLOBENZAPRINE HCL 10 MG
TABLET ORAL
Qty: 60 TABLET | Refills: 2 | Status: SHIPPED | OUTPATIENT
Start: 2021-12-17 | End: 2022-03-03 | Stop reason: SDUPTHER

## 2021-12-17 RX ORDER — PREGABALIN 75 MG/1
75 CAPSULE ORAL 2 TIMES DAILY
Qty: 60 CAPSULE | Refills: 2 | Status: SHIPPED | OUTPATIENT
Start: 2021-12-17 | End: 2022-03-03

## 2021-12-17 NOTE — PROGRESS NOTES
[facility]  TELEMEDICINE Progress Note  Abigail Ramirez,       The risks and benefits of converting to a virtual visit were discussed in light of the current infectious disease epidemic. Patient also understood that insurance coverage and co-pays are up to their individual insurance plans. Patient identification was verified at the start of the visit. Joni Mcmanus  1980 12/17/21    Patient location: Home address on file  Provider location: Physician's home    Chief Complaint:   Joni Mcmanus is a 39 y.o. patient who is here for f/u on ADHD, neck pain and anxiety. HPI:       Experiencing worsening hand paresthesia bilaterally. He would like to see an orthopedic doctor for these issues along with concern for his chronic neck pain. Finding that the gabapentin 300 mg 3 times daily is not helpful enough. Doing well with ADD medication. Denies palpitations, headaches or insomnia. Denies increased anxiety while on medication. Does not endorse hallucinations, delusional thinking, aggression, hostility or any manic episodes. Experiencing worsening neck pain and would like to see an orthopedic doctor. ROS negative for headache, vision changes, chest pain, shortness of breath, abdominal pain, urinary sx, bowel changes. Past medical, surgical, and social history reviewed. Medications and allergies reviewed. Allergies   Allergen Reactions    Lactose Intolerance (Gi) Nausea Only     Prior to Visit Medications    Medication Sig Taking? Authorizing Provider   amphetamine-dextroamphetamine (ADDERALL XR) 30 MG extended release capsule Take 1 capsule by mouth every morning for 30 days. Yes Angelica Montemayor, DO   amphetamine-dextroamphetamine (ADDERALL XR) 30 MG extended release capsule Take 1 capsule by mouth every morning for 30 days.  Yes Angelica Montemayor, DO   amphetamine-dextroamphetamine (ADDERALL XR) 30 MG extended release capsule Take 1 capsule by mouth every morning for 30 days. Yes Kathleen Montemayor, DO   amphetamine-dextroamphetamine (ADDERALL) 30 MG tablet Take 1 tablet by mouth 2 times daily for 60 doses. Take 1 tab po po mid-day. Yes Kathleen Montemayor, DO   amphetamine-dextroamphetamine (ADDERALL) 30 MG tablet Take 1 tablet by mouth 2 times daily for 60 doses. Take 1 tab po po mid-day. Yes Kathleen Montemayor, DO   amphetamine-dextroamphetamine (ADDERALL, 30MG,) 30 MG tablet Take 1 tablet by mouth 2 times daily for 30 days. Take 1 tab po mid-day. Yes Jalen Montemayor, DO   cyclobenzaprine (FLEXERIL) 10 MG tablet TAKE 1 TABLET BY MOUTH THREE TIMES DAILY AS NEEDED FOR MUSCLE SPASMS Yes Jalen Montemayor, DO   ALPRAZolam (XANAX) 1 MG tablet TAKE 1 TABLET BY MOUTH THREE TIMES DAILY AS NEEDED FOR ANXIETY OR SLEEP Yes Kathleen Montemayor DO   sildenafil (VIAGRA) 50 MG tablet Take 1 tablet by mouth as needed for Erectile Dysfunction Yes Kathleen Montemayor DO   pregabalin (LYRICA) 75 MG capsule Take 1 capsule by mouth 2 times daily for 30 days. Yes Kathleen Montemayor, DO   tiZANidine (ZANAFLEX) 4 MG tablet Take 1 tablet by mouth 3 times daily as needed (lower back pain)  Patient not taking: Reported on 12/17/2021  Kathleen Montemayor DO   varenicline (CHANTIX CONTINUING MONTH ROMERO) 1 MG tablet Take 1 tablet by mouth 2 times daily  Paulino Dejesus DO   gabapentin (NEURONTIN) 300 MG capsule Take 3 capsules by mouth 2 times daily for 30 days. Patient not taking: Reported on 9/10/2021  Paulino Dejesus DO          Patient-Reported Vitals 12/17/2021   Patient-Reported Weight 215   Patient-Reported Height -   Patient-Reported Systolic 266   Patient-Reported Diastolic 88   Patient-Reported Pulse 92   Patient-Reported Temperature -      There were no vitals filed for this visit.    Wt Readings from Last 3 Encounters:   05/28/20 215 lb (97.5 kg)   02/21/20 235 lb 3.2 oz (106.7 kg)   12/02/19 232 lb 12.8 oz (105.6 kg) BP Readings from Last 3 Encounters:   02/21/20 126/86   12/02/19 129/83   08/21/19 130/86       Patient Active Problem List   Diagnosis    Brachial plexus injury    Anxiety    History of ADHD    Tobacco use    Anxiety disorder    Cervical strain    Strength loss of left hand    Psychophysiological insomnia    Frequent urination    Elevated blood pressure reading    Acute gastritis without hemorrhage    Lipoma of abdominal wall    Chronic back pain       Immunization History   Administered Date(s) Administered    COVID-19, Pfizer, PF, 30mcg/0.3mL 04/24/2021, 05/26/2021    HPV 9-valent Roseanna Freud) 02/21/2020    Tdap (Boostrix, Adacel) 07/12/2016, 08/10/2017       Past Medical History:   Diagnosis Date    Anxiety     Chronic back pain 6/27/2019    H/O knee surgery     right    H/O shoulder surgery     left shoulder    Hypertension     IBS (irritable bowel syndrome)     MVP (mitral valve prolapse)     Neuropathy      No past surgical history on file. Family History   Problem Relation Age of Onset    High Blood Pressure Father     Heart Disease Father     Substance Abuse Father     Diabetes Paternal Grandmother      Social History     Socioeconomic History    Marital status: Single     Spouse name: Not on file    Number of children: Not on file    Years of education: Not on file    Highest education level: Not on file   Occupational History    Not on file   Tobacco Use    Smoking status: Heavy Tobacco Smoker     Packs/day: 1.00     Years: 22.00     Pack years: 22.00     Types: Cigarettes    Smokeless tobacco: Former User   Substance and Sexual Activity    Alcohol use: Yes     Comment: twice weekly    Drug use: Yes     Types: Opiates      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     Sexual activity: Never   Other Topics Concern    Not on file   Social History Narrative    Not on file     Social Determinants of Health     Financial Resource Strain: High Risk    Difficulty of Paying Living Expenses: Very hard   Food Insecurity: Food Insecurity Present    Worried About Running Out of Food in the Last Year: Often true    [place] of Food in the Last Year: Often true   Transportation Needs: Unmet Transportation Needs    Lack of Transportation (Medical): Yes    Lack of Transportation (Non-Medical): Yes   Physical Activity:     Days of Exercise per Week: Not on file    Minutes of Exercise per Session: Not on file   Stress:     Feeling of Stress : Not on file   Social Connections:     Frequency of Communication with Friends and Family: Not on file    Frequency of Social Gatherings with Friends and Family: Not on file    Attends Roman [employer] Services: Not on file    Active Member of [address] [place] [address] [employer] or Organizations: Not on file    Attends Club or Organization Meetings: Not on file    Marital Status: Not on file   Intimate Partner Violence:     Fear of Current or Ex-Partner: Not on file    Emotionally Abused: Not on file    Physically Abused: Not on file    Sexually Abused: Not on file   Housing Stability:     Unable to Pay for Housing in the Last Year: Not on file    Number of [place] in the Last Year: Not on file    Unstable Housing in the Last Year: Not on file       O: There were no vitals taken for this visit. Physical Exam  PHYSICAL EXAMINATION:  [ INSTRUCTIONS:  \"[x]\" Indicates a positive item  \"[]\" Indicates a negative item  -- DELETE ALL ITEMS NOT EXAMINED]  Vital Signs: (As obtained by patient/caregiver or practitioner observation)    Constitutional: [x] Appears well-developed and well-nourished [x] No apparent distress      [] Abnormal-   Mental status  [x] Alert and awake  [x] Oriented to person/place/time [x]Able to follow commands      Eyes:  EOM    [x]  Normal  [] Abnormal-  Sclera  [x]  Normal  [] Abnormal -         Discharge [x]  None visible  [] Abnormal -    HENT:   [x] Normocephalic, atraumatic.   [] Abnormal   [] Mouth/Throat: Mucous membranes are moist. External Ears [x] Normal  [] Abnormal-     Neck: [x] No visualized mass     Pulmonary/Chest: [x] Respiratory effort normal.  [x] No visualized signs of difficulty breathing or respiratory distress        [] Abnormal-      Musculoskeletal:   [] Normal gait with no signs of ataxia         [x] Normal range of motion of neck        [] Abnormal-       Neurological:        [x] No Facial Asymmetry (Cranial nerve 7 motor function) (limited exam to video visit)          [x] No gaze palsy        [] Abnormal-         Skin:        [x] No significant exanthematous lesions or discoloration noted on facial skin         [] Abnormal-            Psychiatric:       [x] Normal Affect [] No Hallucinations        [] Abnormal-     Other pertinent observable physical exam findings- n/a    Due to this being a TeleHealth encounter, evaluation of the following organ systems is limited: Vitals/Constitutional/EENT/Resp/CV/GI//MS/Neuro/Skin/Heme-Lymph-Imm. ASSESSMENT   Diagnosis Orders   1. Adult attention deficit disorder  amphetamine-dextroamphetamine (ADDERALL XR) 30 MG extended release capsule    amphetamine-dextroamphetamine (ADDERALL XR) 30 MG extended release capsule    amphetamine-dextroamphetamine (ADDERALL XR) 30 MG extended release capsule    amphetamine-dextroamphetamine (ADDERALL) 30 MG tablet    amphetamine-dextroamphetamine (ADDERALL) 30 MG tablet    amphetamine-dextroamphetamine (ADDERALL, 30MG,) 30 MG tablet   2. Back strain, initial encounter  cyclobenzaprine (FLEXERIL) 10 MG tablet   3. Anxiety  ALPRAZolam (XANAX) 1 MG tablet   4. Erectile dysfunction, unspecified erectile dysfunction type  sildenafil (VIAGRA) 50 MG tablet   5. Pelvis tilted  External Referral To Chiropractic   6. Injury of brachial plexus, initial encounter     7. Chronic low back pain with sciatica, sciatica laterality unspecified, unspecified back pain laterality     8.  Paresthesia of both hands  pregabalin (LYRICA) 75 MG capsule     #1: The current medical regimen is effective;  continue present plan and medications. Pt aware of need for every 3 month medication followup appointments, and that medication refills for benzodiazepines, narcotics and/or stimulants will only be given at appointment. The risks, benefits, potential side effects and barriers to medication use were addressed today. Understanding was acknowledged. Patient asked to follow-up if condition(s) do not improve as anticipated. #3: The current medical regimen is effective;  continue present plan and medications. Pt aware of need for every 3 month medication followup appointments, and that medication refills for benzodiazepines, narcotics and/or stimulants will only be given at appointment. The risks, benefits, potential side effects and barriers to medication use were addressed today. Understanding was acknowledged. Patient asked to follow-up if condition(s) do not improve as anticipated. #4: The current medical regimen is effective;  continue present plan and medications. #2, #5, #7 and #8: since not controlled, advised to see chiropractor and orthopedics. Given referral info. Try Lyrica. Consider dose increase if needed. PLAN          Time spent on encounter (including any number of the following: review of labs, imaging, provider notes, outside hospital records; performing examination/evaluation; counseling patient and family; ordering medications/tests; placing referrals and communication with referring physicians; coordination of care, and documentation in the EHR): 34 minutes  Established E/M: 10-19 (15186), 20-29 (89797), 30-39 (52732), 40-54 (92864)   New E/M: 15-29 (19329), 30-44 (96827), 45-59 (02732), 60-74 (41042)  Telephone E/M: 5-10 (Saima), 11-20 (21284), 21-30 (93566)    If applicable, see additional patient information and instructions under \"Patient Instructions. \"    No follow-ups on file.   Patient Instructions   Let your PCP know if you need to fax chiropractor referral.    Go to [email or web address] and see an upper extremity doctor (Dr. Layne Garrison). Or [facility]. Please note a portion of this chart was generated using dragon dictation software. Although every effort was made to ensure the accuracy of this automated transcription, some errors in transcription may have occurred. Pursuant to the emergency declaration under the [address] [place] [address] waiver authority and the [employer] and [employer] Act, this Virtual  Visit was conducted, with patient's consent, to reduce the patient's risk of exposure to COVID-19 and provide continuity of care for an established patient. Services were provided through a  video synchronous discussion virtually to substitute for in-person clinic visit. Patient was instructed that the AVS is available on My Chart or was emailed to the patient if not on My Chart. Lab orders were emailed to patient if they do not use a [facility] lab. Any work notes were sent to patient through My Chart or email.

## 2021-12-17 NOTE — PATIENT INSTRUCTIONS
Let your PCP know if you need to fax chiropractor referral.    Go to [email or web address] and see an upper extremity doctor (Dr. Yesenia Olsen). Or [facility].

## 2022-03-03 ENCOUNTER — TELEPHONE (OUTPATIENT)
Dept: ADMINISTRATIVE | Age: 42
End: 2022-03-03

## 2022-03-03 ENCOUNTER — OFFICE VISIT (OUTPATIENT)
Dept: FAMILY MEDICINE CLINIC | Age: 42
End: 2022-03-03
Payer: MEDICARE

## 2022-03-03 VITALS
SYSTOLIC BLOOD PRESSURE: 146 MMHG | HEART RATE: 116 BPM | WEIGHT: 210.4 LBS | HEIGHT: 72 IN | RESPIRATION RATE: 12 BRPM | DIASTOLIC BLOOD PRESSURE: 98 MMHG | TEMPERATURE: 96.9 F | BODY MASS INDEX: 28.5 KG/M2 | OXYGEN SATURATION: 97 %

## 2022-03-03 DIAGNOSIS — S39.012A BACK STRAIN, INITIAL ENCOUNTER: ICD-10-CM

## 2022-03-03 DIAGNOSIS — F41.9 ANXIETY: ICD-10-CM

## 2022-03-03 DIAGNOSIS — Z13.1 SCREENING FOR DIABETES MELLITUS: ICD-10-CM

## 2022-03-03 DIAGNOSIS — F98.8 ADULT ATTENTION DEFICIT DISORDER: Primary | ICD-10-CM

## 2022-03-03 DIAGNOSIS — N52.9 ERECTILE DYSFUNCTION, UNSPECIFIED ERECTILE DYSFUNCTION TYPE: ICD-10-CM

## 2022-03-03 DIAGNOSIS — Z12.5 PROSTATE CANCER SCREENING: ICD-10-CM

## 2022-03-03 DIAGNOSIS — R35.0 INCREASED FREQUENCY OF URINATION: ICD-10-CM

## 2022-03-03 PROCEDURE — 99214 OFFICE O/P EST MOD 30 MIN: CPT | Performed by: FAMILY MEDICINE

## 2022-03-03 PROCEDURE — G8484 FLU IMMUNIZE NO ADMIN: HCPCS | Performed by: FAMILY MEDICINE

## 2022-03-03 PROCEDURE — 4004F PT TOBACCO SCREEN RCVD TLK: CPT | Performed by: FAMILY MEDICINE

## 2022-03-03 PROCEDURE — G8427 DOCREV CUR MEDS BY ELIG CLIN: HCPCS | Performed by: FAMILY MEDICINE

## 2022-03-03 PROCEDURE — G8417 CALC BMI ABV UP PARAM F/U: HCPCS | Performed by: FAMILY MEDICINE

## 2022-03-03 RX ORDER — DEXTROAMPHETAMINE SACCHARATE, AMPHETAMINE ASPARTATE MONOHYDRATE, DEXTROAMPHETAMINE SULFATE AND AMPHETAMINE SULFATE 7.5; 7.5; 7.5; 7.5 MG/1; MG/1; MG/1; MG/1
30 CAPSULE, EXTENDED RELEASE ORAL EVERY MORNING
Qty: 30 CAPSULE | Refills: 0 | Status: SHIPPED | OUTPATIENT
Start: 2022-03-03 | End: 2022-04-02

## 2022-03-03 RX ORDER — DEXTROAMPHETAMINE SACCHARATE, AMPHETAMINE ASPARTATE MONOHYDRATE, DEXTROAMPHETAMINE SULFATE AND AMPHETAMINE SULFATE 7.5; 7.5; 7.5; 7.5 MG/1; MG/1; MG/1; MG/1
30 CAPSULE, EXTENDED RELEASE ORAL EVERY MORNING
Qty: 30 CAPSULE | Refills: 0 | Status: SHIPPED | OUTPATIENT
Start: 2022-05-02 | End: 2022-03-03 | Stop reason: SDUPTHER

## 2022-03-03 RX ORDER — DEXTROAMPHETAMINE SACCHARATE, AMPHETAMINE ASPARTATE, DEXTROAMPHETAMINE SULFATE AND AMPHETAMINE SULFATE 7.5; 7.5; 7.5; 7.5 MG/1; MG/1; MG/1; MG/1
30 TABLET ORAL 2 TIMES DAILY
Qty: 60 TABLET | Refills: 0 | Status: SHIPPED | OUTPATIENT
Start: 2022-05-02 | End: 2022-03-03 | Stop reason: SDUPTHER

## 2022-03-03 RX ORDER — CYCLOBENZAPRINE HCL 10 MG
TABLET ORAL
Qty: 60 TABLET | Refills: 2 | Status: SHIPPED | OUTPATIENT
Start: 2022-03-03

## 2022-03-03 RX ORDER — SILDENAFIL 50 MG/1
50 TABLET, FILM COATED ORAL PRN
Qty: 10 TABLET | Refills: 3 | Status: SHIPPED | OUTPATIENT
Start: 2022-03-03 | End: 2022-03-03 | Stop reason: SDUPTHER

## 2022-03-03 RX ORDER — DEXTROAMPHETAMINE SACCHARATE, AMPHETAMINE ASPARTATE MONOHYDRATE, DEXTROAMPHETAMINE SULFATE AND AMPHETAMINE SULFATE 7.5; 7.5; 7.5; 7.5 MG/1; MG/1; MG/1; MG/1
30 CAPSULE, EXTENDED RELEASE ORAL EVERY MORNING
Qty: 30 CAPSULE | Refills: 0 | Status: SHIPPED | OUTPATIENT
Start: 2022-04-02 | End: 2022-05-02

## 2022-03-03 RX ORDER — DEXTROAMPHETAMINE SACCHARATE, AMPHETAMINE ASPARTATE, DEXTROAMPHETAMINE SULFATE AND AMPHETAMINE SULFATE 7.5; 7.5; 7.5; 7.5 MG/1; MG/1; MG/1; MG/1
30 TABLET ORAL 2 TIMES DAILY
Qty: 60 TABLET | Refills: 0 | Status: SHIPPED | OUTPATIENT
Start: 2022-04-02 | End: 2022-03-03 | Stop reason: SDUPTHER

## 2022-03-03 RX ORDER — DEXTROAMPHETAMINE SACCHARATE, AMPHETAMINE ASPARTATE, DEXTROAMPHETAMINE SULFATE AND AMPHETAMINE SULFATE 7.5; 7.5; 7.5; 7.5 MG/1; MG/1; MG/1; MG/1
30 TABLET ORAL 2 TIMES DAILY
Qty: 60 TABLET | Refills: 0 | Status: SHIPPED | OUTPATIENT
Start: 2022-03-03 | End: 2022-03-03 | Stop reason: SDUPTHER

## 2022-03-03 RX ORDER — DEXTROAMPHETAMINE SACCHARATE, AMPHETAMINE ASPARTATE MONOHYDRATE, DEXTROAMPHETAMINE SULFATE AND AMPHETAMINE SULFATE 7.5; 7.5; 7.5; 7.5 MG/1; MG/1; MG/1; MG/1
30 CAPSULE, EXTENDED RELEASE ORAL EVERY MORNING
Qty: 30 CAPSULE | Refills: 0 | Status: SHIPPED | OUTPATIENT
Start: 2022-03-03 | End: 2022-03-03 | Stop reason: SDUPTHER

## 2022-03-03 RX ORDER — DEXTROAMPHETAMINE SACCHARATE, AMPHETAMINE ASPARTATE MONOHYDRATE, DEXTROAMPHETAMINE SULFATE AND AMPHETAMINE SULFATE 7.5; 7.5; 7.5; 7.5 MG/1; MG/1; MG/1; MG/1
30 CAPSULE, EXTENDED RELEASE ORAL EVERY MORNING
Qty: 30 CAPSULE | Refills: 0 | Status: SHIPPED | OUTPATIENT
Start: 2022-05-02 | End: 2022-06-01

## 2022-03-03 RX ORDER — DEXTROAMPHETAMINE SACCHARATE, AMPHETAMINE ASPARTATE, DEXTROAMPHETAMINE SULFATE AND AMPHETAMINE SULFATE 7.5; 7.5; 7.5; 7.5 MG/1; MG/1; MG/1; MG/1
30 TABLET ORAL 2 TIMES DAILY
Qty: 60 TABLET | Refills: 0 | Status: SHIPPED | OUTPATIENT
Start: 2022-03-03 | End: 2022-04-02

## 2022-03-03 RX ORDER — DEXTROAMPHETAMINE SACCHARATE, AMPHETAMINE ASPARTATE, DEXTROAMPHETAMINE SULFATE AND AMPHETAMINE SULFATE 7.5; 7.5; 7.5; 7.5 MG/1; MG/1; MG/1; MG/1
30 TABLET ORAL 2 TIMES DAILY
Qty: 60 TABLET | Refills: 0 | Status: SHIPPED | OUTPATIENT
Start: 2022-05-02 | End: 2022-06-01

## 2022-03-03 RX ORDER — SILDENAFIL 50 MG/1
50 TABLET, FILM COATED ORAL PRN
Qty: 10 TABLET | Refills: 3 | Status: SHIPPED | OUTPATIENT
Start: 2022-03-03

## 2022-03-03 RX ORDER — DEXTROAMPHETAMINE SACCHARATE, AMPHETAMINE ASPARTATE, DEXTROAMPHETAMINE SULFATE AND AMPHETAMINE SULFATE 7.5; 7.5; 7.5; 7.5 MG/1; MG/1; MG/1; MG/1
30 TABLET ORAL 2 TIMES DAILY
Qty: 60 TABLET | Refills: 0 | Status: SHIPPED | OUTPATIENT
Start: 2022-04-02 | End: 2022-05-02

## 2022-03-03 RX ORDER — ALPRAZOLAM 1 MG/1
TABLET ORAL
Qty: 90 TABLET | Refills: 2 | Status: SHIPPED | OUTPATIENT
Start: 2022-03-03 | End: 2022-04-02

## 2022-03-03 RX ORDER — DEXTROAMPHETAMINE SACCHARATE, AMPHETAMINE ASPARTATE MONOHYDRATE, DEXTROAMPHETAMINE SULFATE AND AMPHETAMINE SULFATE 7.5; 7.5; 7.5; 7.5 MG/1; MG/1; MG/1; MG/1
30 CAPSULE, EXTENDED RELEASE ORAL EVERY MORNING
Qty: 30 CAPSULE | Refills: 0 | Status: SHIPPED | OUTPATIENT
Start: 2022-04-02 | End: 2022-03-03 | Stop reason: SDUPTHER

## 2022-03-03 SDOH — ECONOMIC STABILITY: FOOD INSECURITY: WITHIN THE PAST 12 MONTHS, THE FOOD YOU BOUGHT JUST DIDN'T LAST AND YOU DIDN'T HAVE MONEY TO GET MORE.: NEVER TRUE

## 2022-03-03 SDOH — ECONOMIC STABILITY: FOOD INSECURITY: WITHIN THE PAST 12 MONTHS, YOU WORRIED THAT YOUR FOOD WOULD RUN OUT BEFORE YOU GOT MONEY TO BUY MORE.: NEVER TRUE

## 2022-03-03 ASSESSMENT — PATIENT HEALTH QUESTIONNAIRE - PHQ9
1. LITTLE INTEREST OR PLEASURE IN DOING THINGS: 0
SUM OF ALL RESPONSES TO PHQ QUESTIONS 1-9: 0
SUM OF ALL RESPONSES TO PHQ QUESTIONS 1-9: 0
SUM OF ALL RESPONSES TO PHQ9 QUESTIONS 1 & 2: 0
SUM OF ALL RESPONSES TO PHQ QUESTIONS 1-9: 0
2. FEELING DOWN, DEPRESSED OR HOPELESS: 0
SUM OF ALL RESPONSES TO PHQ QUESTIONS 1-9: 0

## 2022-03-03 ASSESSMENT — SOCIAL DETERMINANTS OF HEALTH (SDOH): HOW HARD IS IT FOR YOU TO PAY FOR THE VERY BASICS LIKE FOOD, HOUSING, MEDICAL CARE, AND HEATING?: NOT HARD AT ALL

## 2022-03-03 NOTE — PATIENT INSTRUCTIONS
[facility] Laboratory Locations - No appointment necessary. @ indicates the location is open Saturdays in addition to Monday through Friday. Call your preferred location for test preparation, business hours and other information you need. [employer] accepts [employer]. [facility]    @ [place] [facility] [address]   Ph: [phone] [facility] [address]   Ph: [phone]  @ [facility] [address] [facility]   Ph: [phone]    [facility] [address]   Ph: [phone] @ [place] [facility] [address]  Ph: [phone] @ [facility]. [address] [place] [address]   Ph: [phone]    [place]   @ [place] [facility] [address] [place], [place] [address]   Ph: [phone] [facility]. [address] [place] [address]  Ph: [address] [place] [phone], [phone]   [facility]:  [address] Lab Sv[facility] [facility]   Ph: [phone] [facility]. Lab Svcs.   [address]   Ph: [phone]

## 2022-03-03 NOTE — TELEPHONE ENCOUNTER
Submitted PA for Sildenafil Citrate 50MG tablets Via CM Key: [id number] STATUS: DENIED. Denial letter attached. \"Sildenafil Citrate 50MG OR TABS is not a covered medication under [facility] or the [place] Department of Medicaid.  Dx not covered\"

## 2022-03-03 NOTE — PROGRESS NOTES
[facility]  Progress Note  Walton Epley, DO Elda Basil  [birth year]    03/16/22    Chief Complaint:   Sharita Cummings is a 39 y.o. male who is here for ADHD        HPI:   Doing well with ADD medication. Denies palpitations, headaches or insomnia. Denies increased anxiety while on medication. Does not endorse hallucinations, delusional thinking, aggression, hostility or any manic episodes. Continues to take Xanax. 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 ROS negative for headache, visionchanges, chest pain, shortness of breath, abdominal pain, urinary sx, bowel changes. Past medical, surgical, and social history reviewed. and allergies reviewed. Continues to work. Allergies   Allergen Reactions    Lactose Intolerance (Gi) Nausea Only     Prior to Visit Medications    Medication Sig Taking? Authorizing Provider   cyclobenzaprine (FLEXERIL) 10 MG tablet TAKE 1 TABLET BY MOUTH THREE TIMES DAILY AS NEEDED FOR MUSCLE SPASMS Yes Fabian Montemayor, DO   amphetamine-dextroamphetamine (ADDERALL XR) 30 MG extended release capsule Take 1 capsule by mouth every morning for 30 days. Yes Fabian Montemayor, DO   amphetamine-dextroamphetamine (ADDERALL XR) 30 MG extended release capsule Take 1 capsule by mouth every morning for 30 days. Yes Fabian Montemayor, DO   amphetamine-dextroamphetamine (ADDERALL XR) 30 MG extended release capsule Take 1 capsule by mouth every morning for 30 days. Yes Fabian Montemayor, DO   amphetamine-dextroamphetamine (ADDERALL) 30 MG tablet Take 1 tablet by mouth 2 times daily for 60 doses. Take 1 tab po po mid-day. Yes Fabian Montemayor, DO   amphetamine-dextroamphetamine (ADDERALL) 30 MG tablet Take 1 tablet by mouth 2 times daily for 60 doses. Take 1 tab po po mid-day. Yes Fabian Montemayor, DO   amphetamine-dextroamphetamine (ADDERALL, 30MG,) 30 MG tablet Take 1 tablet by mouth 2 times daily for 30 days. Take 1 tab po mid-day. Yes Joe Melissa Montemayor, DO   sildenafil (VIAGRA) 50 MG tablet Take 1 tablet by mouth as needed for Erectile Dysfunction Yes Joe Torres Albina, DO   ALPRAZolam (XANAX) 1 MG tablet TAKE 1 TABLET BY MOUTH THREE TIMES DAILY AS NEEDED FOR ANXIETY OR SLEEP Yes Joe Levinebubba Montemayor DO   tiZANidine (ZANAFLEX) 4 MG tablet Take 1 tablet by mouth 3 times daily as needed (lower back pain)  Patient not taking: Reported on 12/17/2021  Joe Levinebubba Montemayor DO          Vitals:    03/03/22 1405   BP: (!) 146/98   Pulse: 116   Resp: 12   Temp: 96.9 °F (36.1 °C)   TempSrc: Temporal   SpO2: 97%   Weight: 210 lb 6.4 oz (95.4 kg)   Height: 6' (1.829 m)      Wt Readings from Last 3 Encounters:   03/03/22 210 lb 6.4 oz (95.4 kg)   05/28/20 215 lb (97.5 kg)   02/21/20 235 lb 3.2 oz (106.7 kg)     BP Readings from Last 3 Encounters:   03/03/22 (!) 146/98   02/21/20 126/86   12/02/19 129/83       Patient Active Problem List   Diagnosis    Brachial plexus injury    Anxiety    History of ADHD    Tobacco use    Anxiety disorder    Cervical strain    Strength loss of left hand    Psychophysiological insomnia    Frequent urination    Elevated blood pressure reading    Acute gastritis without hemorrhage    Lipoma of abdominal wall    Chronic back pain       Immunization History   Administered Date(s) Administered    COVID-19, Pfizer Purple top, DILUTE for use, 12+ yrs, 30mcg/0.3mL dose 04/24/2021, 05/26/2021    HPV 9-valent Phani Love) 02/21/2020    Tdap (Boostrix, Adacel) 07/12/2016, 08/10/2017       Past Medical History:   Diagnosis Date    Anxiety     Chronic back pain 6/27/2019    H/O knee surgery     right    H/O shoulder surgery     left shoulder    Hypertension     IBS (irritable bowel syndrome)     MVP (mitral valve prolapse)     Neuropathy      No past surgical history on file.   Family History   Problem Relation Age of Onset    High Blood Pressure Father     Heart Disease Father     Substance Abuse Father     Diabetes Paternal Grandmother      Social History     Socioeconomic History    Marital status: Single     Spouse name: Not on file    Number of children: Not on file    Years of education: Not on file    Highest education level: Not on file   Occupational History    Not on file   Tobacco Use    Smoking status: Heavy Tobacco Smoker     Packs/day: 1.00     Years: 22.00     Pack years: 22.00     Types: Cigarettes    Smokeless tobacco: Former User   Substance and Sexual Activity    Alcohol use: Yes     Comment: twice weekly    Drug use: Yes     Types: Opiates      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  Sexual activity: Never   Other Topics Concern    Not on file   Social History Narrative    Not on file     Social Determinants of Health     Financial Resource Strain: Low Risk     Difficulty of Paying Living Expenses: Not hard at all   Food Insecurity: No Food Insecurity    Worried About Running Out of Food in the Last Year: Never true    [address] in the Last Year: Never true   Transportation Needs:     Lack of Transportation (Medical): Not on file    Lack of Transportation (Non-Medical):  Not on file   Physical Activity:     Days of Exercise per Week: Not on file    Minutes of Exercise per Session: Not on file   Stress:     Feeling of Stress : Not on file   Social Connections:     Frequency of Communication with Friends and Family: Not on file    Frequency of Social Gatherings with Friends and Family: Not on file    Attends Advent Services: Not on file    Active Member of Clubs or Organizations: Not on file    Attends Club or Organization Meetings: Not on file    Marital Status: Not on file   Intimate Partner Violence:     Fear of Current or Ex-Partner: Not on file    Emotionally Abused: Not on file    Physically Abused: Not on file    Sexually Abused: Not on file   Housing Stability:     Unable to Pay for Housing in the Last Year: Not on file    Number of Places Lived in the Last Year: Not on file    Unstable Housing in the Last Year: Not on file       O: BP (!) 146/98   Pulse 116   Temp 96.9 °F (36.1 °C) (Temporal)   Resp 12   Ht 6' (1.829 m)   Wt 210 lb 6.4 oz (95.4 kg)   SpO2 97%   BMI 28.54 kg/m²   Physical Exam  GEN: No acute distress,cooperative, well nourished, alert. HEENT: PEERLA, EOMI , normocephalic/atraumatic, external nose appears normal.  External ear is normal.    Neck: soft, supple, no appreciable thyromegaly,mass  CV: No upper extremity edema. Resp:  Breathing comfortably. Psych:normal affect. Neuro: AOx3  Other Pertinent Physical Exam findings: Heart: Normal S1 and S2 with regular rhythm. Lungs: Clear to auscultation bilaterally. Abd: No tenderness to palpation. ASSESSMENT   Diagnosis Orders   1. Adult attention deficit disorder  amphetamine-dextroamphetamine (ADDERALL XR) 30 MG extended release capsule    amphetamine-dextroamphetamine (ADDERALL XR) 30 MG extended release capsule    amphetamine-dextroamphetamine (ADDERALL XR) 30 MG extended release capsule    amphetamine-dextroamphetamine (ADDERALL) 30 MG tablet    amphetamine-dextroamphetamine (ADDERALL) 30 MG tablet    amphetamine-dextroamphetamine (ADDERALL, 30MG,) 30 MG tablet    DISCONTINUED: amphetamine-dextroamphetamine (ADDERALL XR) 30 MG extended release capsule    DISCONTINUED: amphetamine-dextroamphetamine (ADDERALL XR) 30 MG extended release capsule    DISCONTINUED: amphetamine-dextroamphetamine (ADDERALL XR) 30 MG extended release capsule    DISCONTINUED: amphetamine-dextroamphetamine (ADDERALL) 30 MG tablet    DISCONTINUED: amphetamine-dextroamphetamine (ADDERALL) 30 MG tablet    DISCONTINUED: amphetamine-dextroamphetamine (ADDERALL, 30MG,) 30 MG tablet   2. Anxiety  ALPRAZolam (XANAX) 1 MG tablet   3. Back strain, initial encounter  cyclobenzaprine (FLEXERIL) 10 MG tablet   4.  Erectile dysfunction, unspecified erectile dysfunction type sildenafil (VIAGRA) 50 MG tablet    DISCONTINUED: sildenafil (VIAGRA) 50 MG tablet   5. Prostate cancer screening  PSA, Prostatic Specific Antigen   6. Increased frequency of urination  Urinalysis with Microscopic   7. Screening for diabetes mellitus  Hemoglobin A1C    Comprehensive Metabolic Panel     Has not lost CSs while I have been Xu's PCP. #1 and #2: The current medical regimen is effective;  continue present plan and medications. Pt aware of need for every 3 month medication followup appointments, and that medication refills for benzodiazepines, narcotics and/or stimulants will only be given at appointment. The risks, benefits, potential side effects and barriers to medication use were addressed today. Understanding was acknowledged. Patient asked to follow-up if condition(s) do not improve as anticipated. #3: mild flareup. Takes prn. #4: The current medical regimen is effective;  continue present plan and medications. #6: plan urinalysis.  w Increased urination. #7: screen    PLAN          Time spent on encounter (including any number of the following: review of labs, imaging, provider notes, outside hospital records; performing examination/evaluation; counseling patient and family; ordering medications/tests; placing referrals and communication with referring physicians; coordination of care, and documentation in the EHR): 30 minutes  Established E/M: 10-19 (91914), 20-29 (70550), 30-39 (12304), 40-54 (81045)   New E/M: 15-29 (86573), 30-44 (54320), 45-59 (44351), 60-74 (21658)  Telephone E/M: 5-10 (Alejandro), 11-20 (38828), 21-30 (17629)    If applicable, see additional patient information and instructions under \"Patient Instructions. \"    Return in about 3 months (around 6/3/2022) for ADHD, Anxiety w PCP. Patient [facility] Locations - No appointment necessary. @ indicates the location is open Saturdays in addition to Monday through Friday.    Call your preferred location for test preparation, business hours and other information you need. [employer] accepts [employer]. [facility]    @ [facility] [address] [facility] [address] [facility]. [place], [address] [place] [address] Ave   Ph: [phone] [facility] [address]   Ph: [phone]  @ [facility] [address] [facility]   Ph: [phone]    [facility] [address]   Ph: [phone] @ [facility] [address]  Ph: [phone] @ [place] [facility] [address] [place] [address]   Ph: [phone]    [place]   @ [facility] [address] [place], [place] [address]   Ph: [phone] [facility] [address] [facility], [address] [facility]  Ph: [address]:  [address] [facility] [address] [facility]   Ph: [phone] [facility]. [facility] [place], [place] [address]   Ph: [phone]                Please note a portion of this chart was generated using dragon dictation software. Although every effort was made to ensure the accuracy of this automated transcription,some errors in transcription may have occurred.

## 2022-03-04 DIAGNOSIS — Z13.1 SCREENING FOR DIABETES MELLITUS: ICD-10-CM

## 2022-03-04 DIAGNOSIS — Z12.5 PROSTATE CANCER SCREENING: ICD-10-CM

## 2022-03-04 LAB
BILIRUBIN URINE: NEGATIVE
BLOOD, URINE: NEGATIVE
CLARITY: CLEAR
COLOR: YELLOW
EPITHELIAL CELLS, UA: 0 /HPF (ref 0–5)
GLUCOSE URINE: NEGATIVE MG/DL
HYALINE CASTS: 0 /LPF (ref 0–8)
KETONES, URINE: NEGATIVE MG/DL
LEUKOCYTE ESTERASE, URINE: NEGATIVE
MICROSCOPIC EXAMINATION: NORMAL
NITRITE, URINE: NEGATIVE
PH UA: 6 (ref 5–8)
PROTEIN UA: NEGATIVE MG/DL
RBC UA: 1 /HPF (ref 0–4)
SPECIFIC GRAVITY UA: 1.01 (ref 1–1.03)
URINE TYPE: NORMAL
UROBILINOGEN, URINE: 0.2 E.U./DL
WBC UA: 0 /HPF (ref 0–5)

## 2022-03-04 NOTE — TELEPHONE ENCOUNTER
Sildenafil was routed to [address] [place] [address] in [place] at patient's request.    Since not on the insurance plan he would have to use the good Rx coupon. Please let him know and provide him assistance if he needs to navigate the website.

## 2022-03-05 LAB
A/G RATIO: 2 (ref 1.1–2.2)
ALBUMIN SERPL-MCNC: 4.6 G/DL (ref 3.4–5)
ALP BLD-CCNC: 70 U/L (ref 40–129)
ALT SERPL-CCNC: 20 U/L (ref 10–40)
ANION GAP SERPL CALCULATED.3IONS-SCNC: 13 MMOL/L (ref 3–16)
AST SERPL-CCNC: 19 U/L (ref 15–37)
BILIRUB SERPL-MCNC: 0.4 MG/DL (ref 0–1)
BUN BLDV-MCNC: 16 MG/DL (ref 7–20)
CALCIUM SERPL-MCNC: 9.5 MG/DL (ref 8.3–10.6)
CHLORIDE BLD-SCNC: 101 MMOL/L (ref 99–110)
CO2: 27 MMOL/L (ref 21–32)
CREAT SERPL-MCNC: 1 MG/DL (ref 0.9–1.3)
ESTIMATED AVERAGE GLUCOSE: 108.3 MG/DL
GFR AFRICAN AMERICAN: >60
GFR NON-AFRICAN AMERICAN: >60
GLUCOSE BLD-MCNC: 102 MG/DL (ref 70–99)
HBA1C MFR BLD: 5.4 %
POTASSIUM SERPL-SCNC: 4.4 MMOL/L (ref 3.5–5.1)
PROSTATE SPECIFIC ANTIGEN: 1.43 NG/ML (ref 0–4)
SODIUM BLD-SCNC: 141 MMOL/L (ref 136–145)
TOTAL PROTEIN: 6.9 G/DL (ref 6.4–8.2)

## 2023-05-17 ENCOUNTER — APPOINTMENT (OUTPATIENT)
Dept: GENERAL RADIOLOGY | Age: 43
End: 2023-05-17
Payer: MEDICAID

## 2023-05-17 ENCOUNTER — HOSPITAL ENCOUNTER (EMERGENCY)
Age: 43
Discharge: HOME | End: 2023-05-17
Attending: EMERGENCY MEDICINE
Payer: MEDICAID

## 2023-05-17 VITALS
DIASTOLIC BLOOD PRESSURE: 71 MMHG | RESPIRATION RATE: 18 BRPM | HEART RATE: 95 BPM | BODY MASS INDEX: 28.07 KG/M2 | OXYGEN SATURATION: 95 % | HEIGHT: 73 IN | TEMPERATURE: 99.4 F | WEIGHT: 211.8 LBS | SYSTOLIC BLOOD PRESSURE: 111 MMHG

## 2023-05-17 DIAGNOSIS — J06.9 UPPER RESPIRATORY TRACT INFECTION, UNSPECIFIED TYPE: Primary | ICD-10-CM

## 2023-05-17 DIAGNOSIS — K04.7 DENTAL INFECTION: ICD-10-CM

## 2023-05-17 LAB
AMORPH SED URNS QL MICRO: NORMAL /HPF
ANION GAP SERPL CALCULATED.3IONS-SCNC: 9 MMOL/L (ref 3–16)
BASOPHILS # BLD: 0 K/UL (ref 0–0.2)
BASOPHILS NFR BLD: 0.2 %
BILIRUB UR QL STRIP.AUTO: NEGATIVE
BUN SERPL-MCNC: 11 MG/DL (ref 7–20)
CALCIUM SERPL-MCNC: 8.8 MG/DL (ref 8.3–10.6)
CHLORIDE SERPL-SCNC: 106 MMOL/L (ref 99–110)
CLARITY UR: CLEAR
CO2 SERPL-SCNC: 23 MMOL/L (ref 21–32)
COLOR UR: YELLOW
CREAT SERPL-MCNC: 0.9 MG/DL (ref 0.9–1.3)
DEPRECATED RDW RBC AUTO: 13 % (ref 12.4–15.4)
EOSINOPHIL # BLD: 0 K/UL (ref 0–0.6)
EOSINOPHIL NFR BLD: 0 %
EPI CELLS #/AREA URNS HPF: NORMAL /HPF (ref 0–5)
FLUAV RNA UPPER RESP QL NAA+PROBE: NEGATIVE
FLUBV AG NPH QL: NEGATIVE
GFR SERPLBLD CREATININE-BSD FMLA CKD-EPI: >60 ML/MIN/{1.73_M2}
GLUCOSE SERPL-MCNC: 116 MG/DL (ref 70–99)
GLUCOSE UR STRIP.AUTO-MCNC: NEGATIVE MG/DL
HCT VFR BLD AUTO: 46.4 % (ref 40.5–52.5)
HGB BLD-MCNC: 15.9 G/DL (ref 13.5–17.5)
HGB UR QL STRIP.AUTO: NEGATIVE
KETONES UR STRIP.AUTO-MCNC: ABNORMAL MG/DL
LEUKOCYTE ESTERASE UR QL STRIP.AUTO: NEGATIVE
LYMPHOCYTES # BLD: 0.4 K/UL (ref 1–5.1)
LYMPHOCYTES NFR BLD: 6.1 %
MCH RBC QN AUTO: 30.9 PG (ref 26–34)
MCHC RBC AUTO-ENTMCNC: 34.2 G/DL (ref 31–36)
MCV RBC AUTO: 90.3 FL (ref 80–100)
MONOCYTES # BLD: 0.3 K/UL (ref 0–1.3)
MONOCYTES NFR BLD: 4.9 %
NEUTROPHILS # BLD: 5.9 K/UL (ref 1.7–7.7)
NEUTROPHILS NFR BLD: 88.8 %
NITRITE UR QL STRIP.AUTO: NEGATIVE
PH UR STRIP.AUTO: 6.5 [PH] (ref 5–8)
PLATELET # BLD AUTO: 171 K/UL (ref 135–450)
PMV BLD AUTO: 7.8 FL (ref 5–10.5)
POTASSIUM SERPL-SCNC: 3.7 MMOL/L (ref 3.5–5.1)
PROT UR STRIP.AUTO-MCNC: ABNORMAL MG/DL
RBC # BLD AUTO: 5.14 M/UL (ref 4.2–5.9)
RBC #/AREA URNS HPF: NORMAL /HPF (ref 0–4)
SARS-COV-2 RDRP RESP QL NAA+PROBE: NOT DETECTED
SODIUM SERPL-SCNC: 138 MMOL/L (ref 136–145)
SP GR UR STRIP.AUTO: 1.02 (ref 1–1.03)
UA DIPSTICK W REFLEX MICRO PNL UR: YES
URN SPEC COLLECT METH UR: ABNORMAL
UROBILINOGEN UR STRIP-ACNC: 1 E.U./DL
WBC # BLD AUTO: 6.7 K/UL (ref 4–11)
WBC #/AREA URNS HPF: NORMAL /HPF (ref 0–5)

## 2023-05-17 PROCEDURE — 2580000003 HC RX 258: Performed by: EMERGENCY MEDICINE

## 2023-05-17 PROCEDURE — 81001 URINALYSIS AUTO W/SCOPE: CPT

## 2023-05-17 PROCEDURE — 87804 INFLUENZA ASSAY W/OPTIC: CPT

## 2023-05-17 PROCEDURE — 85025 COMPLETE CBC W/AUTO DIFF WBC: CPT

## 2023-05-17 PROCEDURE — 71046 X-RAY EXAM CHEST 2 VIEWS: CPT

## 2023-05-17 PROCEDURE — 80048 BASIC METABOLIC PNL TOTAL CA: CPT

## 2023-05-17 PROCEDURE — 99284 EMERGENCY DEPT VISIT MOD MDM: CPT

## 2023-05-17 PROCEDURE — 6370000000 HC RX 637 (ALT 250 FOR IP): Performed by: EMERGENCY MEDICINE

## 2023-05-17 PROCEDURE — 96361 HYDRATE IV INFUSION ADD-ON: CPT

## 2023-05-17 PROCEDURE — 96374 THER/PROPH/DIAG INJ IV PUSH: CPT

## 2023-05-17 PROCEDURE — 6360000002 HC RX W HCPCS: Performed by: EMERGENCY MEDICINE

## 2023-05-17 PROCEDURE — 87635 SARS-COV-2 COVID-19 AMP PRB: CPT

## 2023-05-17 RX ORDER — CLINDAMYCIN HYDROCHLORIDE 300 MG/1
300 CAPSULE ORAL 4 TIMES DAILY
Qty: 40 CAPSULE | Refills: 0 | Status: SHIPPED | OUTPATIENT
Start: 2023-05-17 | End: 2023-05-27

## 2023-05-17 RX ORDER — KETOROLAC TROMETHAMINE 30 MG/ML
15 INJECTION, SOLUTION INTRAMUSCULAR; INTRAVENOUS ONCE
Status: COMPLETED | OUTPATIENT
Start: 2023-05-17 | End: 2023-05-17

## 2023-05-17 RX ORDER — ALPRAZOLAM 1 MG/1
1 TABLET ORAL 3 TIMES DAILY PRN
COMMUNITY
Start: 2023-04-27

## 2023-05-17 RX ORDER — PENICILLIN V POTASSIUM 500 MG/1
TABLET ORAL
COMMUNITY
Start: 2023-05-15 | End: 2023-05-17 | Stop reason: ALTCHOICE

## 2023-05-17 RX ORDER — 0.9 % SODIUM CHLORIDE 0.9 %
1000 INTRAVENOUS SOLUTION INTRAVENOUS ONCE
Status: COMPLETED | OUTPATIENT
Start: 2023-05-17 | End: 2023-05-17

## 2023-05-17 RX ORDER — CLINDAMYCIN HYDROCHLORIDE 300 MG/1
300 CAPSULE ORAL ONCE
Status: COMPLETED | OUTPATIENT
Start: 2023-05-17 | End: 2023-05-17

## 2023-05-17 RX ADMIN — CLINDAMYCIN HYDROCHLORIDE 300 MG: 300 CAPSULE ORAL at 06:24

## 2023-05-17 RX ADMIN — SODIUM CHLORIDE 1000 ML: 9 INJECTION, SOLUTION INTRAVENOUS at 05:01

## 2023-05-17 RX ADMIN — KETOROLAC TROMETHAMINE 15 MG: 30 INJECTION, SOLUTION INTRAMUSCULAR; INTRAVENOUS at 05:03

## 2023-05-17 ASSESSMENT — ENCOUNTER SYMPTOMS
NAUSEA: 1
SORE THROAT: 0
EYES NEGATIVE: 1
DIARRHEA: 1
ABDOMINAL PAIN: 0
RESPIRATORY NEGATIVE: 1
TROUBLE SWALLOWING: 0
VOMITING: 0

## 2023-05-17 ASSESSMENT — PAIN SCALES - GENERAL
PAINLEVEL_OUTOF10: 8
PAINLEVEL_OUTOF10: 3

## 2023-05-17 ASSESSMENT — PAIN - FUNCTIONAL ASSESSMENT
PAIN_FUNCTIONAL_ASSESSMENT: 0-10
PAIN_FUNCTIONAL_ASSESSMENT: 0-10

## 2023-05-17 ASSESSMENT — PAIN DESCRIPTION - LOCATION: LOCATION: TEETH

## 2023-05-17 NOTE — ED TRIAGE NOTES
Dental pain started last week had a fever last Sunday and started Penicillin on Monday still having pain and fever.

## 2023-05-17 NOTE — ED NOTES
Patient discharged to home via family. Written discharge instructions reviewed with understanding. Copy of AVS  sent home with patient. Patient able to walk from ED without assistance.         Bernardino Gutierres RN  05/17/23 2108

## 2023-05-17 NOTE — ED PROVIDER NOTES
[address] [facility]          ATTENDING PHYSICIAN NOTE       Date of evaluation: 5/17/2023    Chief Complaint     Dental Pain and Fever (Dental pain started last week had a fever last Sunday and started Penicillin on Monday still having pain and fever.)      History of Present Illness     Anne-Marie Brady is a 43 y.o. male who presents to the emergency department complaining of fever, dental pain, and body aches. Patient states that 4 days ago he started developing fever as well as pain in the tooth where he had a filling fall out several months ago. He was seen by dentist and was started on penicillin which she states he has been taking but is continued to have pain and fevers as high as 102. He denies any difficulty breathing or swallowing but states he has had pain to the side of his neck. He does note some nausea denies any vomiting. He states he has had diarrhea. He denies any burning with urination, pain with urination, but has noted increased urinary frequency. He denies any recent sick contacts. He states that he has been taking the antibiotics as well as Advil and Tylenol without improvement of his symptoms. Patient does note a remote history of IV drug abuse but has not used in several years. ASSESSMENT / PLAN  (MEDICAL DECISION MAKING)     INITIAL VITALS: BP: (!) 118/102, Temp: 99.4 °F (37.4 °C), Pulse: (!) 113, Respirations: 18, SpO2: 98 %      Anne-Marie Brady is a 43 y.o. male presents complaining of fevers, diarrhea, and body aches. Patient states that he was diagnosed with an abscessed tooth and started on penicillin but his symptoms have continued. On arrival, patient is hemodynamically stable. He has clear breath sounds and normal heart sounds. He has a soft abdomen. Laboratory studies are significant for normal CBC and renal panel. Rapid flu and COVID are negative.   Chest x-ray shows no infiltrate but does show evidence of mild inflammatory

## 2023-05-17 NOTE — DISCHARGE INSTRUCTIONS
Your symptoms are likely due to an upper respiratory tract infection, but I cannot say that it is not due to your dental pain and likely infection. Please stop taking the penicillin and start taking clindamycin.   Please follow-up with your primary care provider for further evaluation as well as follow-up with your dentist.